# Patient Record
Sex: FEMALE | Race: WHITE | Employment: STUDENT | URBAN - METROPOLITAN AREA
[De-identification: names, ages, dates, MRNs, and addresses within clinical notes are randomized per-mention and may not be internally consistent; named-entity substitution may affect disease eponyms.]

---

## 2017-12-18 ENCOUNTER — HOSPITAL ENCOUNTER (INPATIENT)
Age: 22
LOS: 4 days | Discharge: HOME | DRG: 881 | End: 2017-12-22
Attending: STUDENT IN AN ORGANIZED HEALTH CARE EDUCATION/TRAINING PROGRAM | Admitting: PSYCHIATRY & NEUROLOGY
Payer: COMMERCIAL

## 2017-12-18 DIAGNOSIS — R45.851 SUICIDAL IDEATIONS: ICD-10-CM

## 2017-12-18 DIAGNOSIS — F41.8 ANXIETY ASSOCIATED WITH DEPRESSION: ICD-10-CM

## 2017-12-18 DIAGNOSIS — F32.A DEPRESSION, UNSPECIFIED DEPRESSION TYPE: Primary | ICD-10-CM

## 2017-12-18 LAB
ALBUMIN SERPL-MCNC: 3.9 G/DL (ref 3.5–5)
ALBUMIN/GLOB SERPL: 1 {RATIO} (ref 1.1–2.2)
ALP SERPL-CCNC: 99 U/L (ref 45–117)
ALT SERPL-CCNC: 27 U/L (ref 12–78)
AMPHET UR QL SCN: NEGATIVE
ANION GAP SERPL CALC-SCNC: 5 MMOL/L (ref 5–15)
APAP SERPL-MCNC: <2 UG/ML (ref 10–30)
APPEARANCE UR: ABNORMAL
AST SERPL-CCNC: 16 U/L (ref 15–37)
BACTERIA URNS QL MICRO: NEGATIVE /HPF
BARBITURATES UR QL SCN: NEGATIVE
BASOPHILS # BLD: 0 K/UL (ref 0–0.1)
BASOPHILS NFR BLD: 0 % (ref 0–1)
BENZODIAZ UR QL: NEGATIVE
BILIRUB SERPL-MCNC: 0.3 MG/DL (ref 0.2–1)
BILIRUB UR QL: NEGATIVE
BUN SERPL-MCNC: 8 MG/DL (ref 6–20)
BUN/CREAT SERPL: 12 (ref 12–20)
CALCIUM SERPL-MCNC: 9.4 MG/DL (ref 8.5–10.1)
CANNABINOIDS UR QL SCN: NEGATIVE
CHLORIDE SERPL-SCNC: 103 MMOL/L (ref 97–108)
CO2 SERPL-SCNC: 30 MMOL/L (ref 21–32)
COCAINE UR QL SCN: NEGATIVE
COLOR UR: ABNORMAL
CREAT SERPL-MCNC: 0.67 MG/DL (ref 0.55–1.02)
DRUG SCRN COMMENT,DRGCM: NORMAL
EOSINOPHIL # BLD: 0.1 K/UL (ref 0–0.4)
EOSINOPHIL NFR BLD: 2 % (ref 0–7)
EPITH CASTS URNS QL MICRO: ABNORMAL /LPF
ERYTHROCYTE [DISTWIDTH] IN BLOOD BY AUTOMATED COUNT: 12.6 % (ref 11.5–14.5)
ETHANOL SERPL-MCNC: <10 MG/DL
GLOBULIN SER CALC-MCNC: 3.9 G/DL (ref 2–4)
GLUCOSE SERPL-MCNC: 93 MG/DL (ref 65–100)
GLUCOSE UR STRIP.AUTO-MCNC: NEGATIVE MG/DL
HCG UR QL: NEGATIVE
HCT VFR BLD AUTO: 41.1 % (ref 35–47)
HGB BLD-MCNC: 13.4 G/DL (ref 11.5–16)
HGB UR QL STRIP: NEGATIVE
HYALINE CASTS URNS QL MICRO: ABNORMAL /LPF (ref 0–5)
KETONES UR QL STRIP.AUTO: NEGATIVE MG/DL
LEUKOCYTE ESTERASE UR QL STRIP.AUTO: NEGATIVE
LYMPHOCYTES # BLD: 1.7 K/UL (ref 0.8–3.5)
LYMPHOCYTES NFR BLD: 24 % (ref 12–49)
MCH RBC QN AUTO: 28.2 PG (ref 26–34)
MCHC RBC AUTO-ENTMCNC: 32.6 G/DL (ref 30–36.5)
MCV RBC AUTO: 86.5 FL (ref 80–99)
METHADONE UR QL: NEGATIVE
MONOCYTES # BLD: 0.4 K/UL (ref 0–1)
MONOCYTES NFR BLD: 5 % (ref 5–13)
NEUTS SEG # BLD: 4.9 K/UL (ref 1.8–8)
NEUTS SEG NFR BLD: 69 % (ref 32–75)
NITRITE UR QL STRIP.AUTO: NEGATIVE
OPIATES UR QL: NEGATIVE
PCP UR QL: NEGATIVE
PH UR STRIP: 6 [PH] (ref 5–8)
PLATELET # BLD AUTO: 307 K/UL (ref 150–400)
POTASSIUM SERPL-SCNC: 3.9 MMOL/L (ref 3.5–5.1)
PROT SERPL-MCNC: 7.8 G/DL (ref 6.4–8.2)
PROT UR STRIP-MCNC: NEGATIVE MG/DL
RBC # BLD AUTO: 4.75 M/UL (ref 3.8–5.2)
RBC #/AREA URNS HPF: ABNORMAL /HPF (ref 0–5)
SALICYLATES SERPL-MCNC: <1.7 MG/DL (ref 2.8–20)
SODIUM SERPL-SCNC: 138 MMOL/L (ref 136–145)
SP GR UR REFRACTOMETRY: 1.01 (ref 1–1.03)
TSH SERPL DL<=0.05 MIU/L-ACNC: 1.02 UIU/ML (ref 0.36–3.74)
UA: UC IF INDICATED,UAUC: ABNORMAL
UROBILINOGEN UR QL STRIP.AUTO: 0.2 EU/DL (ref 0.2–1)
WBC # BLD AUTO: 7.1 K/UL (ref 3.6–11)
WBC URNS QL MICRO: ABNORMAL /HPF (ref 0–4)

## 2017-12-18 PROCEDURE — 80307 DRUG TEST PRSMV CHEM ANLYZR: CPT | Performed by: EMERGENCY MEDICINE

## 2017-12-18 PROCEDURE — 65220000003 HC RM SEMIPRIVATE PSYCH

## 2017-12-18 PROCEDURE — 81001 URINALYSIS AUTO W/SCOPE: CPT | Performed by: EMERGENCY MEDICINE

## 2017-12-18 PROCEDURE — 99284 EMERGENCY DEPT VISIT MOD MDM: CPT

## 2017-12-18 PROCEDURE — 36415 COLL VENOUS BLD VENIPUNCTURE: CPT | Performed by: EMERGENCY MEDICINE

## 2017-12-18 PROCEDURE — 85025 COMPLETE CBC W/AUTO DIFF WBC: CPT | Performed by: EMERGENCY MEDICINE

## 2017-12-18 PROCEDURE — 84443 ASSAY THYROID STIM HORMONE: CPT | Performed by: PSYCHIATRY & NEUROLOGY

## 2017-12-18 PROCEDURE — 81025 URINE PREGNANCY TEST: CPT

## 2017-12-18 PROCEDURE — 80053 COMPREHEN METABOLIC PANEL: CPT | Performed by: EMERGENCY MEDICINE

## 2017-12-18 RX ORDER — SERTRALINE HYDROCHLORIDE 50 MG/1
100 TABLET, FILM COATED ORAL DAILY
Status: DISCONTINUED | OUTPATIENT
Start: 2017-12-19 | End: 2017-12-19

## 2017-12-18 RX ORDER — ZOLPIDEM TARTRATE 5 MG/1
5 TABLET ORAL
Status: DISCONTINUED | OUTPATIENT
Start: 2017-12-18 | End: 2017-12-22 | Stop reason: HOSPADM

## 2017-12-18 RX ORDER — SERTRALINE HYDROCHLORIDE 100 MG/1
100 TABLET, FILM COATED ORAL DAILY
Status: ON HOLD | COMMUNITY
End: 2017-12-22

## 2017-12-18 RX ORDER — IBUPROFEN 200 MG
1 TABLET ORAL
Status: DISCONTINUED | OUTPATIENT
Start: 2017-12-18 | End: 2017-12-22 | Stop reason: HOSPADM

## 2017-12-18 RX ORDER — LORAZEPAM 2 MG/ML
2 INJECTION INTRAMUSCULAR
Status: DISCONTINUED | OUTPATIENT
Start: 2017-12-18 | End: 2017-12-22 | Stop reason: HOSPADM

## 2017-12-18 RX ORDER — LORAZEPAM 1 MG/1
1 TABLET ORAL
Status: DISCONTINUED | OUTPATIENT
Start: 2017-12-18 | End: 2017-12-19

## 2017-12-18 RX ORDER — ADHESIVE BANDAGE
30 BANDAGE TOPICAL DAILY PRN
Status: DISCONTINUED | OUTPATIENT
Start: 2017-12-18 | End: 2017-12-22 | Stop reason: HOSPADM

## 2017-12-18 RX ORDER — BENZTROPINE MESYLATE 2 MG/1
2 TABLET ORAL
Status: DISCONTINUED | OUTPATIENT
Start: 2017-12-18 | End: 2017-12-22 | Stop reason: HOSPADM

## 2017-12-18 RX ORDER — ACETAMINOPHEN 325 MG/1
650 TABLET ORAL
Status: DISCONTINUED | OUTPATIENT
Start: 2017-12-18 | End: 2017-12-22 | Stop reason: HOSPADM

## 2017-12-18 RX ORDER — IBUPROFEN 400 MG/1
400 TABLET ORAL
Status: DISCONTINUED | OUTPATIENT
Start: 2017-12-18 | End: 2017-12-22 | Stop reason: HOSPADM

## 2017-12-18 RX ORDER — METHYLPHENIDATE HYDROCHLORIDE 5 MG/1
15 TABLET ORAL 2 TIMES DAILY
COMMUNITY

## 2017-12-18 RX ORDER — BENZTROPINE MESYLATE 1 MG/ML
2 INJECTION INTRAMUSCULAR; INTRAVENOUS
Status: DISCONTINUED | OUTPATIENT
Start: 2017-12-18 | End: 2017-12-22 | Stop reason: HOSPADM

## 2017-12-18 RX ORDER — OLANZAPINE 5 MG/1
5 TABLET ORAL
Status: DISCONTINUED | OUTPATIENT
Start: 2017-12-18 | End: 2017-12-22 | Stop reason: HOSPADM

## 2017-12-18 NOTE — ED PROVIDER NOTES
Patient is a 23 y.o. female presenting with mental health disorder and suicidal ideation. Mental Health Problem    Associated symptoms include self-injury. Suicidal   Pertinent negatives include no shortness of breath, no vomiting and no headaches. Pt states that she suffers from anxiety and depression for years. 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 She has been havign suicidal thoughts and a plan to overdose. She is accompanied by the campus security and a representative from the [place]. Denies fever, cold symptoms, headache, neck pain, visual changes, focal weakness or rash. Denies any difficulty breathing, difficulty swallowing, SOB, chest pain or abdominal pain. Denies any nausea, vomiting or diarrhea. Pt. Reports that she has not had any medications today prior to arrival.        Past Medical History:   Diagnosis Date    ADHD     Asthma     Depression     Psychiatric disorder     depression       History reviewed. No pertinent surgical history. History reviewed. No pertinent family history. Social History     Social History    Marital status: SINGLE     Spouse name: N/A    Number of children: N/A    Years of education: N/A     Occupational History    Not on file. Social History Main Topics    Smoking status: Never Smoker    Smokeless tobacco: Never Used    Alcohol use Yes      Comment: socially    Drug use: No    Sexual activity: Not on file     Other Topics Concern    Not on file     Social History Narrative    No narrative on file         ALLERGIES: Review of patient's allergies indicates no known allergies. Review of Systems   Constitutional: Negative for activity change and appetite change. HENT: Negative for facial swelling, sore throat and trouble swallowing. Eyes: Negative. Respiratory: Negative for shortness of breath. Cardiovascular: Negative.     Gastrointestinal: Negative for abdominal pain, diarrhea and vomiting. Genitourinary: Negative for dysuria. Musculoskeletal: Negative for back pain and neck pain. Skin: Negative for color change. Neurological: Negative for headaches. Psychiatric/Behavioral: Positive for self-injury and suicidal ideas. The patient is nervous/anxious. Vitals:    12/18/17 1628   BP: 123/73   Pulse: 88   Resp: 16   Temp: 98 °F (36.7 °C)   SpO2: 97%   Weight: 87.5 kg (193 lb)   Height: 5' 7\" (1.702 m)            Physical Exam   Constitutional: She is oriented to person, place, and time. She appears well-nourished. White female;non  Smoker; international student/reta at the [employer]:   Head: Normocephalic. Right Ear: External ear normal.   Left Ear: External ear normal.   Mouth/Throat: Oropharynx is clear and moist.   Eyes: Conjunctivae and EOM are normal. Pupils are equal, round, and reactive to light. Wears glasses   Neck: Normal range of motion. Neck supple. Cardiovascular: Normal rate and regular rhythm. Pulmonary/Chest: Effort normal and breath sounds normal.   Abdominal: Soft. Bowel sounds are normal.   Musculoskeletal: Normal range of motion. Lymphadenopathy:     She has no cervical adenopathy. Neurological: She is alert and oriented to person, place, and time. Skin: Skin is warm and dry. No rash noted. Nursing note and vitals reviewed. [facility]  ED Course       Procedures    Bsmart consult and evaluation  Dr. Gustavo Toribio was consulted and will admit. Pt was re-examined; crying and anxious; refused any medications at this time. 6:53 PM  Patient's results and plan of care have been reviewed with her and her school representative. Patient has verbally conveyed her understanding and agreement of her signs, symptoms, diagnosis, treatment and prognosis and additionally agrees to be admitted. Layla Xie NP  Discussed plan of care with Dr. Stanford Denson.  Layla Xie NP

## 2017-12-18 NOTE — IP AVS SNAPSHOT
[address] 
[phone] Patient: Suyapa Domingo MRN: [id number] :1995 My Medications STOP taking these medications   
 diazePAM 5 mg tablet Commonly known as:  VALIUM  
   
  
  
TAKE these medications as instructed Instructions Each Dose to Equal  
 Morning Noon Evening Bedtime RITALIN 5 mg tablet Generic drug:  methylphenidate HCl Your last dose was: Your next dose is: Take 15 mg by mouth two (2) times a day. 15 mg  
    
   
   
   
  
 sertraline 100 mg tablet Commonly known as:  ZOLOFT Your last dose was: Your next dose is: Take 1.5 Tabs by mouth daily. Indications: major depressive disorder 150 mg Where to Get Your Medications These medications were sent to [facility]/pharmacy #[id number]- [place], [address] [facility] [address]  [address] [place] [address] Phone:  [phone]  
  sertraline 100 mg tablet

## 2017-12-18 NOTE — IP AVS SNAPSHOT
Summary of Care Report The Summary of Care report has been created to help improve care coordination. Users with access to [employer] or [address] (Web-based application) may access additional patient information including the Discharge Summary. If you are not currently a [address] user and need more information, please call the number listed below in the [place] [address] section and ask to be connected with Medical Records. Facility Information Name Address Phone [phone] [facility] [phone] Patient Information Patient Name Sex MARY JANE Vasquez ([id number]) Female 1995 Discharge Information Admitting Provider Service Area Unit Sofia Christie MD / [id number] / [phone] Discharge Provider Discharge Date/Time Discharge Disposition Destination (none) 2017 Afternoon (Pending) AHR (none) Patient Language Language ENGLISH [13] Hospital Problems as of 2017  Never Reviewed Class Noted - Resolved Last Modified POA Active Problems * (Principal)Depression  2017 - Present 2017 by Daryl Cee MD Unknown Entered by Sofia Christie MD  
  
You are allergic to the following No active allergies Current Discharge Medication List  
  
CONTINUE these medications which have CHANGED Dose & Instructions Dispensing Information Comments  
 sertraline 100 mg tablet Commonly known as:  ZOLOFT What changed:  how much to take Dose:  150 mg Take 1.5 Tabs by mouth daily. Indications: major depressive disorder Quantity:  45 Tab Refills:  0 CONTINUE these medications which have NOT CHANGED Dose & Instructions Dispensing Information Comments RITALIN 5 mg tablet Generic drug:  methylphenidate HCl Dose:  15 mg Take 15 mg by mouth two (2) times a day. Refills:  0 STOP taking these medications Comments  
 diazePAM 5 mg tablet Commonly known as:  VALIUM Follow-up Information Follow up With Details Comments Contact Info [facility]OTT POLANCO [facility] Partial Hospitalization Program On 2017 Time of Your Appt: 11:00 am [place] [facility] 
[id number] [facility] [address] (To the left of the Emergency Department) [facility] 
[phone] Fax to [facility] @ [phone] [address] Sail[address] Rogersintia On 2018 [place] of Your Appt: 3:00pm [facility] Wanda Gordon [address] [facility], [address] [place] [address] 
[phone] Dr Tank Boo  On 2018 Time of Your Appt: 12 Noon [employer] CAPS None   None (395) Patient stated that they have no PCP Discharge Instructions DISCHARGE SUMMARY 
 
Latonya Matthews : 1995 MRN: [id number] The patient Alonzo Lakhani exhibits the ability to control behavior in a less restrictive environment. Patient's level of functioning is improving. No assaultive/destructive behavior has been observed for the past 24 hours. No suicidal/homicidal threat or behavior has been observed for the past 24 hours. There is no evidence of serious medication side effects. Patient has not been in physical or protective restraints for at least the past 24 hours. If weapons involved, how are they secured? No weapons involved. Is patient aware of and in agreement with discharge plan? Yes Arrangements for medication:  Prescriptions given to patient. Referral for substance abuse treatment? Not applicable. Referral for smoking cessation needed? Not applicable. Copy of discharge instructions to provider?:  Mike Reynolds @ [phone] Arrangements for transportation home:  Linda Puga 326 Keep all follow up appointments as scheduled, continue to take prescribed medications per physician instructions. Mental health crisis number:  249 or your local mental health crisis line number at [phone]. Chart Review Routing History No Routing History on File

## 2017-12-18 NOTE — IP AVS SNAPSHOT
[address] 
[phone] Patient: Ashish Cesar MRN: [id number] :1995 About your hospitalization You were admitted on:  2017 You last received care in the:  [address] You were discharged on:  2017 Why you were hospitalized Your primary diagnosis was:  Depression Things You Need To Do (next 8 weeks) Follow up with None Where:  None (395) Patient stated that they have no PCP Tuesday Dec 26, 2017 Follow up with SCOTT POLANCO [facility] Hospitalization [facility]  
Time of Your Appt: 11:00 am  
  
Where:  [place] [facility] 
[id number] [facility] [address] (To the left of the Emergency Department) [facility] 
[phone] Fax to Rupert Garcia @ [phone]  Follow up with Lashon Medina of Your Appt: 3:00pm  
  
Where:  [facility] [employer] [facility], [address] [place] [address] 
[phone]  Follow up with Dr Roxana Callahan Time of Your Appt: 12 Noon Where:  [place] CAPS Discharge Orders None A check rick indicates which time of day the medication should be taken. My Medications STOP taking these medications   
 diazePAM 5 mg tablet Commonly known as:  VALIUM  
   
  
  
TAKE these medications as instructed Instructions Each Dose to Equal  
 Morning Noon Evening Bedtime RITALIN 5 mg tablet Generic drug:  methylphenidate HCl Your last dose was: Your next dose is: Take 15 mg by mouth two (2) times a day. 15 mg  
    
   
   
   
  
 sertraline 100 mg tablet Commonly known as:  ZOLOFT Your last dose was: Your next dose is: Take 1.5 Tabs by mouth daily. Indications: major depressive disorder 150 mg Where to Get Your Medications These medications were sent to [facility]/pharmacy #[id number]- [place], [address]  [address] [facility], [address] [place] [address] Phone:  [phone]  
  sertraline 100 mg tablet Discharge Instructions DISCHARGE SUMMARY 
 
Celestine Penn : 1995 MRN: [id number] The patient Ananya Gonsalez exhibits the ability to control behavior in a less restrictive environment. Patient's level of functioning is improving. No assaultive/destructive behavior has been observed for the past 24 hours. No suicidal/homicidal threat or behavior has been observed for the past 24 hours. There is no evidence of serious medication side effects. Patient has not been in physical or protective restraints for at least the past 24 hours. If weapons involved, how are they secured? No weapons involved. Is patient aware of and in agreement with discharge plan? Yes Arrangements for medication:  Prescriptions given to patient. Referral for substance abuse treatment? Not applicable. Referral for smoking cessation needed? Not applicable. Copy of discharge instructions to provider?:  Amanda Reynolds @ [phone] Arrangements for transportation home:  Linda Puga [facility] Keep all follow up appointments as scheduled, continue to take prescribed medications per physician instructions. Mental health crisis number:  900 or your local mental health crisis line number at [phone]. Introducing [facility]! [facility] introduces [employer] patient portal. Now you can access parts of your medical record, email your doctor's office, and request medication refills online. 1. In your internet browser, go to [email or web address] 2. Click on the First Time User? Click Here link in the Sign In box. You will see the New Member Sign Up page. 3. Enter your Sitem[employer]her Access Code exactly as it appears below. You will not need to use this code after youve completed the sign-up process. If you do not sign up before the expiration date, you must request a new code. · S[employer]her Access Code: [email or web address] Expires: 3/22/2018  3:56 PM 
 
4. Enter the last four digits of your Social Security Number (xxxx) and Date of Birth (mm/dd/yyyy) as indicated and click Submit. You will be taken to the next sign-up page. 5. Create a Sitemasher ID. This will be your Sitemasher login ID and cannot be changed, so think of one that is secure and easy to remember. 6. Create a S[employer]her password. You can change your password at any time. 7. Enter your Password Reset Question and Answer. This can be used at a later time if you forget your password. 8. Enter your e-mail address. You will receive e-mail notification when new information is available in [address] 9. Click Sign Up. You can now view and download portions of your medical record. 10. Click the Download Summary menu link to download a portable copy of your medical information. If you have questions, please visit the Frequently Asked Questions section of the S[employer]her website. Remember, Sitemasher is NOT to be used for urgent needs. For medical emergencies, dial 911. Now available from your iPhone and Android! Providers Seen During Your Hospitalization Provider Specialty Primary office phone India Cisneros MD Emergency Medicine [phone] Екатерина Rodriguez MD Psychiatry [phone] Your Primary Care Physician (PCP) Primary Care Physician Office Phone Office Fax NONE ** None ** ** None ** You are allergic to the following No active allergies Recent Documentation Height Weight Breastfeeding? BMI Smoking Status 1.702 m 87.5 kg No 30.23 kg/m2 Never Smoker Emergency Contacts Name Discharge Info Relation Home Work Mobile None,None NO [2] Other Relative [6] [phone] Patient Belongings The following personal items are in your possession at time of discharge: 
  Dental Appliances: None  Visual Aid: Glasses, With patient      Home Medications: None   Jewelry: None  Clothing: Shirt (3 shirtys)    Other Valuables: None  Personal Items Sent to Safe: none Please provide this summary of care documentation to your next provider. Signatures-by signing, you are acknowledging that this After Visit Summary has been reviewed with you and you have received a copy. Patient Signature:  ____________________________________________________________ Date:  ____________________________________________________________  
  
[facility] Gene Provider Signature:  ____________________________________________________________ Date:  ____________________________________________________________

## 2017-12-18 NOTE — ED TRIAGE NOTES
Pt brought in by [employer] for Suicidal Ideation for a couple of days, police called by counselor. Reports increased stress. Plan to overdose on Tylenol. Hx of previous attempts with psychiatric admission. Denies HI. Pt contracts to safety while in ED, [employer] accompanying patient.

## 2017-12-19 PROCEDURE — 74011250637 HC RX REV CODE- 250/637: Performed by: PSYCHIATRY & NEUROLOGY

## 2017-12-19 PROCEDURE — 65220000003 HC RM SEMIPRIVATE PSYCH

## 2017-12-19 RX ORDER — SERTRALINE HYDROCHLORIDE 50 MG/1
150 TABLET, FILM COATED ORAL DAILY
Status: DISCONTINUED | OUTPATIENT
Start: 2017-12-20 | End: 2017-12-22 | Stop reason: HOSPADM

## 2017-12-19 RX ORDER — LORAZEPAM 0.5 MG/1
0.5 TABLET ORAL
Status: DISCONTINUED | OUTPATIENT
Start: 2017-12-19 | End: 2017-12-22 | Stop reason: HOSPADM

## 2017-12-19 RX ORDER — METHYLPHENIDATE HYDROCHLORIDE 5 MG/1
15 TABLET ORAL 2 TIMES DAILY
Status: DISCONTINUED | OUTPATIENT
Start: 2017-12-19 | End: 2017-12-22 | Stop reason: HOSPADM

## 2017-12-19 RX ORDER — HYDROXYZINE 50 MG/1
50 TABLET, FILM COATED ORAL
Status: DISCONTINUED | OUTPATIENT
Start: 2017-12-19 | End: 2017-12-22 | Stop reason: HOSPADM

## 2017-12-19 RX ORDER — DIAZEPAM 5 MG/1
5 TABLET ORAL
COMMUNITY
End: 2017-12-22

## 2017-12-19 RX ADMIN — METHYLPHENIDATE HYDROCHLORIDE 15 MG: 5 TABLET ORAL at 11:34

## 2017-12-19 RX ADMIN — SERTRALINE HYDROCHLORIDE 100 MG: 50 TABLET ORAL at 08:04

## 2017-12-19 RX ADMIN — ZOLPIDEM TARTRATE 5 MG: 5 TABLET ORAL at 21:29

## 2017-12-19 RX ADMIN — LORAZEPAM 0.5 MG: 0.5 TABLET ORAL at 23:09

## 2017-12-19 NOTE — BH NOTES
GROUP THERAPY PROGRESS NOTE    Tyler Nobles is participating in reflection group.      Group time: 15 minutes    Personal goal for participation: Daily progress     Goal orientation: personal    Group therapy participation: active    Therapeutic interventions reviewed and discussed:  Unit rules and regulations, coloring festive pictures while talking about PT daily goal    Impression of participation: active

## 2017-12-19 NOTE — PROGRESS NOTES
Luisa Presley actively participated in 9 [facility] about [address] [facility] on [address] [facility].      6377 Christina Jones M.Div, M.S, Grace 602 available at [facility](7038)

## 2017-12-19 NOTE — INTERDISCIPLINARY ROUNDS
Behavioral Health Interdisciplinary Rounds     Patient Name: Bernadette Vera  Age: 23 y.o.   Room/Bed:  727/  Primary Diagnosis: <principal problem not specified>   Admission Status: Voluntary     Readmission within 30 days: no  Power of  in place: no  Patient requires a blocked bed: no          Reason for blocked bed: n/a    VTE Prophylaxis: No  Flu vaccine given : no - refused   Mobility needs/Fall risk: no    Nutritional Plan: no  Consults:          Labs/Testing due today?: no    Sleep hours:   4 1/4hrs      Participation in Care/Groups:  yes  Medication Compliant?: New Admit  PRNS (last 24 hours): None    Restraints (last 24 hours):  no  Substance Abuse:  no  CIWA (range last 24 hours):  COWS (range last 24 hours):   Alcohol screening (AUDIT) completed -  AUDIT Score: 2  If applicable, date SBIRT discussed in treatment team AND documented: N/A  Tobacco - patient is a smoker: no   Date tobacco education completed by RN: n/a  24 hour chart check complete: yes     Patient goal(s) for today: attend all groups  Treatment team focus/goals: psychosocial; start medications  Progress note: Patient reports that she has been feeling suicidal.  Cannot return home to [place] for the holidays due to finances     LOS:  1  Expected LOS: TBD    Financial concerns/prescription coverage: Uninsured  Date of last family contact: None      Family requesting physician contact today: No  Discharge plan: Return to campus when stable for discharge  Guns in the home: No       Outpatient provider(s): [facility]    Participating treatment team members: Bernadette Vera, RHIANNON Buck; Dr. Elder Shabazz MD; Mark Villafana, BILLIE; Lina Lange, GisellaD

## 2017-12-19 NOTE — BH NOTES
PSYCHIATRIC PROGRESS NOTE         Patient Name  Mattie Boyce   Date of Birth 1995   [facility] [id number]   Medical Record Number  [id number]      Age  23 y.o. PCP None   Admit date:  12/18/2017    Room Number  727/01  @ [facility]   Date of Service  12/19/2017          PSYCHOTHERAPY SESSION NOTE:  Length of psychotherapy session: 45 minutes    Main condition/diagnosis/issues treated during session today, 12/19/2017 : anxiety management, coping skills, medication management    I employed Cognitive Behavioral therapy techniques, Reality-Oriented psychotherapy, as well as supportive psychotherapy in regards to various ongoing psychosocial stressors, including the following: pre-admission and current problems; housing issues; occupational issues; academic issues; medical issues; and stress of hospitalization. Interpersonal relationship issues and psychodynamic conflicts explored. Attempts made to alleviate maladaptive patterns. We, also, worked on issues of denial & effects of substance dependency/use     Overall, patient is not progressing    Treatment Plan Update (reviewed an updated 12/19/2017) : I will modify psychotherapy tx plan by implementing more stress management strategies, building upon cognitive behavioral techniques, increasing coping skills, as well as shoring up psychological defenses). n extended energy and skill set was needed to engage pt in psychotherapy due to some of the following: resistiveness, complexity, negativity, confrontational nature, hostile behaviors, and/or severe abnormalities in thought processes/psychosis resulting in the loss of expressive/receptive language communication skills. E & M PROGRESS NOTE:         HISTORY       CC:  \"SI depression \"  HISTORY OF PRESENT ILLNESS/INTERVAL HISTORY:  (reviewed/updated 12/19/2017).   per initial evaluation:     Mattie Boyce presents/reports/evidences the following emotional symptoms today, 12/19/2017:depression and suicidal thoughts/threats. The above symptoms have been present for 8 months . These symptoms are of severe severity. The symptoms are constant  in nature. Additional symptomatology and features include anxiety. SIDE EFFECTS: (reviewed/updated 12/19/2017)  None reported or admitted to. No noted toxicity with use of Depakote/Tegretol/lithium/Clozaril/TCAs   ALLERGIES:(reviewed/updated 12/19/2017)  No Known Allergies   MEDICATIONS PRIOR TO ADMISSION:(reviewed/updated 12/19/2017)  Prescriptions Prior to Admission   Medication Sig    diazePAM (VALIUM) 5 mg tablet Take 5 mg by mouth daily as needed for Anxiety. Indications: anxiety    sertraline (ZOLOFT) 100 mg tablet Take 100 mg by mouth daily.  methylphenidate HCl (RITALIN) 5 mg tablet Take 15 mg by mouth two (2) times a day. PAST MEDICAL HISTORY: Past medical history from the initial psychiatric evaluation has been reviewed (reviewed/updated 12/19/2017) with no additional updates (I asked patient and no additional past medical history provided). Past Medical History:   Diagnosis Date    ADHD     Asthma     Depression     Psychiatric disorder     depression    Sleep disorder     Suicidal thoughts    History reviewed. No pertinent surgical history. SOCIAL HISTORY: Social history from the initial psychiatric evaluation has been reviewed (reviewed/updated 12/19/2017) with no additional updates (I asked patient and no additional social history provided). Social History     Social History    Marital status: SINGLE     Spouse name: N/A    Number of children: N/A    Years of education: N/A     Occupational History    Not on file.      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 Pt is depressed and has SI with no plan. She is from [facility] ,and is homesick. She lives alone. FAMILY HISTORY: Family history from the initial psychiatric evaluation has been reviewed (reviewed/updated 12/19/2017) with no additional updates (I asked patient and no additional family history provided). History reviewed. No pertinent family history. REVIEW OF SYSTEMS: (reviewed/updated 12/19/2017)  Appetite:no change from normal   Sleep: no change   All other Review of Systems: Psychological ROS: positive for - behavioral disorder  Respiratory ROS: no cough, shortness of breath, or wheezing  Cardiovascular ROS: no chest pain or dyspnea on exertion         [facility] (MSE):    MSE FINDINGS ARE WITHIN NORMAL LIMITS (WNL) UNLESS OTHERWISE STATED BELOW. ( ALL OF THE BELOW CATEGORIES OF THE MSE HAVE BEEN REVIEWED (reviewed 12/19/2017) AND UPDATED AS DEEMED APPROPRIATE )  General Presentation age appropriate, cooperative   Orientation oriented to time, place and person   Vital Signs  See below (reviewed 12/19/2017); Vital Signs (BP, Pulse, & Temp) are within normal limits if not listed below.    Gait and Station Stable/steady, no ataxia   Musculoskeletal System No extrapyramidal symptoms (EPS); no abnormal muscular movements or Tardive Dyskinesia (TD); muscle strength and tone are within normal limits   Language No aphasia or dysarthria   Speech:  monotone   Thought Processes logical; normal rate of thoughts; poor abstract reasoning/computation   Thought Associations circumstantial   Thought Content free of delusions   Suicidal Ideations contracts for safety   Homicidal Ideations none   Mood:  sad   Affect:  mood-congruent   Memory recent  fair   Memory remote:  fair   Concentration/Attention:  distractable   Fund of Knowledge average   Insight:  limited   Reliability fair   Judgment:  limited VITALS:     Patient Vitals for the past 24 hrs:   Temp Pulse Resp BP SpO2   12/19/17 1131 97.7 °F (36.5 °C) 68 16 116/63 98 %   12/19/17 0715 98.3 °F (36.8 °C) 75 16 122/82 97 %   12/18/17 1935 98.4 °F (36.9 °C) 73 18 125/76 99 %   12/18/17 1854 98.2 °F (36.8 °C) 74 16 131/76 100 %   12/18/17 1628 98 °F (36.7 °C) 88 16 123/73 97 %     Wt Readings from Last 3 Encounters:   12/18/17 87.5 kg (193 lb)     Temp Readings from Last 3 Encounters:   12/19/17 97.7 °F (36.5 °C)     BP Readings from Last 3 Encounters:   12/19/17 116/63     Pulse Readings from Last 3 Encounters:   12/19/17 68            DATA     LABORATORY DATA:(reviewed/updated 12/19/2017)  Recent Results (from the past 24 hour(s))   CBC WITH AUTOMATED DIFF    Collection Time: 12/18/17  4:54 PM   Result Value Ref Range    WBC 7.1 3.6 - 11.0 K/uL    RBC 4.75 3.80 - 5.20 M/uL    HGB 13.4 11.5 - 16.0 g/dL    HCT 41.1 35.0 - 47.0 %    MCV 86.5 80.0 - 99.0 FL    MCH 28.2 26.0 - 34.0 PG    MCHC 32.6 30.0 - 36.5 g/dL    RDW 12.6 11.5 - 14.5 %    PLATELET 300 767 - 160 K/uL    NEUTROPHILS 69 32 - 75 %    LYMPHOCYTES 24 12 - 49 %    MONOCYTES 5 5 - 13 %    EOSINOPHILS 2 0 - 7 %    BASOPHILS 0 0 - 1 %    ABS. NEUTROPHILS 4.9 1.8 - 8.0 K/UL    ABS. LYMPHOCYTES 1.7 0.8 - 3.5 K/UL    ABS. MONOCYTES 0.4 0.0 - 1.0 K/UL    ABS. EOSINOPHILS 0.1 0.0 - 0.4 K/UL    ABS. BASOPHILS 0.0 0.0 - 0.1 K/UL   METABOLIC PANEL, COMPREHENSIVE    Collection Time: 12/18/17  4:54 PM   Result Value Ref Range    Sodium 138 136 - 145 mmol/L    Potassium 3.9 3.5 - 5.1 mmol/L    Chloride 103 97 - 108 mmol/L    CO2 30 21 - 32 mmol/L    Anion gap 5 5 - 15 mmol/L    Glucose 93 65 - 100 mg/dL    BUN 8 6 - 20 MG/DL    Creatinine 0.67 0.55 - 1.02 MG/DL    BUN/Creatinine ratio 12 12 - 20      GFR est AA >60 >60 ml/min/1.73m2    GFR est non-AA >60 >60 ml/min/1.73m2    Calcium 9.4 8.5 - 10.1 MG/DL    Bilirubin, total 0.3 0.2 - 1.0 MG/DL    ALT (SGPT) 27 12 - 78 U/L    AST (SGOT) 16 15 - 37 U/L    Alk. phosphatase 99 45 - 117 U/L    Protein, total 7.8 6.4 - 8.2 g/dL    Albumin 3.9 3.5 - 5.0 g/dL    Globulin 3.9 2.0 - 4.0 g/dL    A-G Ratio 1.0 (L) 1.1 - 2.2     ACETAMINOPHEN    Collection Time: 12/18/17  4:54 PM   Result Value Ref Range    Acetaminophen level <2 (L) 10 - 30 ug/mL   SALICYLATE    Collection Time: 12/18/17  4:54 PM   Result Value Ref Range    Salicylate level <0.9 (L) 2.8 - 20.0 MG/DL   ETHYL ALCOHOL    Collection Time: 12/18/17  4:54 PM   Result Value Ref Range    ALCOHOL(ETHYL),SERUM <10 <10 MG/DL   TSH 3RD GENERATION    Collection Time: 12/18/17  4:54 PM   Result Value Ref Range    TSH 1.02 0.36 - 3.74 uIU/mL   HCG URINE, QL. - POC    Collection Time: 12/18/17  5:03 PM   Result Value Ref Range    Pregnancy test,urine (POC) NEGATIVE  NEG     URINALYSIS W/ REFLEX CULTURE    Collection Time: 12/18/17  5:05 PM   Result Value Ref Range    Color YELLOW/STRAW      Appearance CLOUDY (A) CLEAR      Specific gravity 1.015 1.003 - 1.030      pH (UA) 6.0 5.0 - 8.0      Protein NEGATIVE  NEG mg/dL    Glucose NEGATIVE  NEG mg/dL    Ketone NEGATIVE  NEG mg/dL    Bilirubin NEGATIVE  NEG      Blood NEGATIVE  NEG      Urobilinogen 0.2 0.2 - 1.0 EU/dL    Nitrites NEGATIVE  NEG      Leukocyte Esterase NEGATIVE  NEG      WBC 0-4 0 - 4 /hpf    RBC 5-10 0 - 5 /hpf    Epithelial cells FEW FEW /lpf    Bacteria NEGATIVE  NEG /hpf    UA:UC IF INDICATED CULTURE NOT INDICATED BY UA RESULT CNI      Hyaline cast 2-5 0 - 5 /lpf   DRUG SCREEN, URINE    Collection Time: 12/18/17  5:05 PM   Result Value Ref Range    AMPHETAMINES NEGATIVE  NEG      BARBITURATES NEGATIVE  NEG      BENZODIAZEPINES NEGATIVE  NEG      COCAINE NEGATIVE  NEG      METHADONE NEGATIVE  NEG      OPIATES NEGATIVE  NEG      PCP(PHENCYCLIDINE) NEGATIVE  NEG      THC (TH-CANNABINOL) NEGATIVE  NEG      Drug screen comment (NOTE)      No results found for: VALF2, VALAC, VALP, VALPR, DS6, CRBAM, CRBAMP, CARB2, XCRBAM  No results found for: LITHM   RADIOLOGY REPORTS:(reviewed/updated 12/19/2017)  No results found.        MEDICATIONS     ALL MEDICATIONS:   Current Facility-Administered Medications   Medication Dose Route Frequency    methylphenidate HCl (RITALIN) tablet 15 mg  15 mg Oral BID    [START ON 12/20/2017] sertraline (ZOLOFT) tablet 150 mg  150 mg Oral DAILY    LORazepam (ATIVAN) tablet 0.5 mg  0.5 mg Oral BID PRN    hydrOXYzine HCl (ATARAX) tablet 50 mg  50 mg Oral QID PRN    ziprasidone (GEODON) 20 mg in sterile water (preservative free) 1 mL injection  20 mg IntraMUSCular BID PRN    OLANZapine (ZyPREXA) tablet 5 mg  5 mg Oral Q6H PRN    benztropine (COGENTIN) tablet 2 mg  2 mg Oral BID PRN    benztropine (COGENTIN) injection 2 mg  2 mg IntraMUSCular BID PRN    LORazepam (ATIVAN) injection 2 mg  2 mg IntraMUSCular Q4H PRN    zolpidem (AMBIEN) tablet 5 mg  5 mg Oral QHS PRN    acetaminophen (TYLENOL) tablet 650 mg  650 mg Oral Q4H PRN    ibuprofen (MOTRIN) tablet 400 mg  400 mg Oral Q8H PRN    magnesium hydroxide (MILK OF MAGNESIA) 400 mg/5 mL oral suspension 30 mL  30 mL Oral DAILY PRN    nicotine (NICODERM CQ) 21 mg/24 hr patch 1 Patch  1 Patch TransDERmal DAILY PRN      SCHEDULED MEDICATIONS:   Current Facility-Administered Medications   Medication Dose Route Frequency    methylphenidate HCl (RITALIN) tablet 15 mg  15 mg Oral BID    [START ON 12/20/2017] sertraline (ZOLOFT) tablet 150 mg  150 mg Oral DAILY          ASSESSMENT & PLAN     DIAGNOSES REQUIRING ACTIVE TREATMENT AND MONITORING: (reviewed/updated 12/19/2017)  Patient Active Hospital Problem List:   Depression (12/18/2017)    Assessment: sadness, hopelessness, helplessness, poor energy and insomnia     Plan: Increase antidepressant and consider augmentation             I will continue to monitor blood levels (Depakote, Tegretol, lithium, clozapine---a drug with a narrow therapeutic index= NTI) and associated labs for drug therapy implemented that require intense monitoring for toxicity as deemed appropriate based on current medication side effects and pharmacodynamically determined drug 1/2 lives. In summary, Davis Cortes, is a 23 y.o.  female who presents with a severe exacerbation of the principal diagnosis of Depression  Patient's condition is worsening/not improving/not stable   Patient requires continued inpatient hospitalization for further stabilization, safety monitoring and medication management. I will continue to coordinate the provision of individual, milieu, occupational, group, and substance abuse therapies to address target symptoms/diagnoses as deemed appropriate for the individual patient. A coordinated, multidisplinary treatment team round was conducted with the patient (this team consists of the nurse, psychiatric unit pharmcist,  and writer). Complete current electronic health record for patient has been reviewed today including consultant notes, ancillary staff notes, nurses and psychiatric tech notes. Suicide risk assessment completed and patient deemed to be of low risk for suicide at this time. The following regarding medications was addressed during rounds with patient:   the risks and benefits of the proposed medication. The patient was given the opportunity to ask questions. Informed consent given to the use of the above medications. Will continue to adjust psychiatric and non-psychiatric medications (see above \"medication\" section and orders section for details) as deemed appropriate & based upon diagnoses and response to treatment. I will continue to order blood tests/labs and diagnostic tests as deemed appropriate and review results as they become available (see orders for details and above listed lab/test results). I will order psychiatric records from previous [facility] hospitals to further elucidate the nature of patient's psychopathology and review once available.     I will gather additional collateral information from friends, family and o/p treatment team to further elucidate the nature of patient's psychopathology and baselline level of psychiatric functioning. I certify that this patient's inpatient psychiatric hospital services furnished since the previous certification were, and continue to be, required for treatment that could reasonably be expected to improve the patient's condition, or for diagnostic study, and that the patient continues to need, on a daily basis, active treatment furnished directly by or requiring the supervision of inpatient psychiatric facility personnel. In addition, the hospital records show that services furnished were intensive treatment services, admission or related services, or equivalent services.     EXPECTED DISCHARGE DATE/DAY: TBD     DISPOSITION: Home       Signed By:   Guru Santos MD  12/19/2017

## 2017-12-19 NOTE — PROGRESS NOTES
Skin Assessment performed by: SANTIAGO, RN and NURIA RN    Skin is intact. ressure Ulcer Documentation  (COMPLETE ONE LABEL PER PRESSURE ULCER)  For further information, please review corresponding Wound Care flowsheet. Lucillebetina Zachary has:    No pressure injury noted and pressure ulcer prevention initiated.

## 2017-12-19 NOTE — ROUTINE PROCESS
TRANSFER - OUT REPORT:    Verbal report given to Cony Matt RN (name) on Ulisses Reeves  being transferred to Psych (unit) for routine progression of care       Report consisted of patients Situation, Background, Assessment and   Recommendations(SBAR). Information from the following report(s) SBAR, ED Summary, [facility] and Recent Results was reviewed with the receiving nurse. Lines:       Opportunity for questions and clarification was provided.       Patient transported with:   Registered Nurse & HPD

## 2017-12-19 NOTE — PROGRESS NOTES
Problem: Depressed Mood (Adult/Pediatric)  Goal: *STG: Remains safe in hospital  Outcome: Progressing Towards Goal  Pt asleep in bed. Continues on q15 min checks and [place] Precautions for safety. Will continue to monitor and assess pt.

## 2017-12-19 NOTE — BH NOTES
GROUP THERAPY PROGRESS NOTE    Aaron Olivo [Héctor] participated in a Morning Process Group on the General Unit with a focus on identifying feelings, planning for the day, and learning more about DBT concepts of \"Mindfulness. \"     Group time: 65 minutes. Personal goal for participation: To identify feelings and increase the capacity to manage ones ability to cope. Goal orientation: The patient will be able to introduce themselves to their peers, identify their feelings, and consider the importance of coping skill development. The group session included a didactic section and the opportunity for patients to respond. Group therapy participation: This patient participated in the therapy session. Therapeutic interventions reviewed and discussed: The patients were encouraged to identify themselves by their first names, acknowledge their feelings, and define a goal for their day. This was followed by a didactic session on DBT mindsets. These concepts included:   1) One-Mindfully: In the moment on the front and the following suggestions on the back of the card: a) Just do one thing at a time; b) Let go of distractions; c) Come back to the moment and what you are doing; and d) Do each thing with all your attention. 2) Effectiveness: Focus on what works: a) Do what needs to be done; b) Play by the rules (I am not an exception) and consider the context; c) Act skillfully within the given situation; d) Keep an eye on your objectives (and do what is necessary); e) Let go of vengeance, useless anger, and the need to be righteous. 3) Observe: Just notice: a) Have a Phil mind; b) Control your attention, cling to nothing; c) Be alert; d) Step inside and observe; e) Watch thoughts come and go; f) Notice what flows through your senses.    4) Nonjudgmental Stance: a) Be aware but dont evaluate; b) Sort opinions from facts; c) Accept each moment; d) Acknowledge the helpful and the harmful, but dont  it; e) Dont  your judging. 5) Wise Mind: a) Integration of emotion mind and reasonable mind; b) Allows intuition; c) Find it in the belly, the center of your head, or by following your breath. At the end of the session all the group members were provided a summary of the topics discussed and a worksheet to review on their own time. Impression of participation: The patient said she was feeling \"tired. ..and wanted to die, before coming to the hospital.\" She denied any current SI and any HI. The patient displayed no overt psychotic symptoms in group. She added that, \"I have no where to go for [place]. 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 \" She went on to say she was studying American literature and that she was a student at the [employer]. She participated the didactic portion of the session and displayed no overt psychotic symptoms in group. Her affect was depressed. Her mood was sad and homesick. This was the patient's first process group with the undersigned.

## 2017-12-19 NOTE — BSMART NOTE
Comprehensive Assessment Form Part 1    Section I - Disposition      The Medical Doctor to Psychiatrist conference was completed. The Medical Doctor is in agreement with Psychiatrist disposition because of (reason) suicidal ideation with plan to overdose. The plan is to admit the patient to the general unit in the [facility] at [facility].  The on-call Psychiatrist consulted was Dr. Ren Paulino. The admitting Psychiatrist will be Dr. Ren Paulino. The admitting Diagnosis is Depression and Anxiety Disorder . Section II - Integrated Summary  Summary:  The Patient's therapist, Rocío Reynolds, called this writer to inform that the patient would be arriving in the ED with [facility] staff and police with suicidal ideation. Rocío Reynolds explained that the patient considers himself transgender and if planning to transition to a male and be called Yris Cabrales. The patient reportedly did not arrive for the weekly Friday session and there were concerns that she was decompensating. She was able to contract for safety through the weekend but on Monday during a scheduled session she shared that she had suicidal ideation including a plan to overdose. She also disclosed to her therapist that she had taken 3 tylenol on Sunday but then stopped herself. She is seeing her campus therapist for trauma and anxiety. 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 This writer met with the patient individually; Gerald Steen presented with flat affect, hugging her stuffed animal and with depressed mood. She reports that she wants \"to die\" and states the reason being \"school, friends, just life it doesn't feel worth it. \" Patient reports a lack of appetite but when asked about sleep she stated her sleep is \"nice\" and about 5 hours last night.  In the middle of the interview she looked up and stated \"your clock isn't working\" and had to be redirected to the interview. She reports a diagnosis of Depression/ADHD and \"I think at one point there was talk of some OCD. Pt is voluntary and agreeable to in-patient behavior health hospitalization. The patient is deemed competent to provide informed consent. The information is given by the patient and campus therapist, Bimal Reynolds. The Chief Complaint is suicidal ideation and depression. The Precipitant Factors are friends leaving for the holiday and her feeling alone. Previous Hospitalizations: April 2017 for an attempted OD  The patient has not previously been in restraints. Current Psychiatrist and/or  is [facility] of [employer]. Lethality Assessment:    The potential for suicide is noted by the following: noted by the following;  intent, previous history of attempts which occured on (date)April 2017 in the form(s) of Overdose of Tylenol, defined plan, current attempt, ideation and means. The potential for homicide is not noted. The patient has not been a perpetrator of sexual or physical abuse. There are not pending charges. The patient is felt to be at risk for self harm or harm to others. The attending nurse was advised to remove potentially harmful or dangerous items from the patient's room . Section III - Psychosocial  The patient's overall mood and attitude is Depressed and sad. Feelings of helplessness and hopelessness are observed by Patient report. Generalized anxiety is not observed. Panic is not observed. Phobias are not observed. Obsessive compulsive tendencies are not observed. Section IV - Mental Status Exam  The patient's appearance is unkempt and is tense. The patient's behavior is guarded. The patient is oriented to time, place, person and situation. The patient's speech is soft. The patient's mood  is depressed and is withdrawn. The range of affect is labile.   The patient's thought content  demonstrates no evidence of impairment. The thought process perseveration. The patient's perception shows no evidence of impairment. The patient's memory shows no evidence of impairment. The patient's appetite shows no evidence of impairment. The patient's sleep has no evidence of impairement. The patient shows little insight. The patient's judgement is psychologically impaired. Section V - Substance Abuse  The patient is not using substances. Section VI - Living Arrangements  The patient is single. The patient lives alone. The patient has no children. The patient does plan to return home upon discharge. The patient does not have legal issues pending. The patient's source of income comes from family. Roman Catholic and cultural practices have not been voiced at this time. The patient's greatest support comes from friends and school staff and this person will not be involved with the treatment. The patient has not been in an event described as horrible or outside the realm of ordinary life experience either currently or in the past.  The patient has not been a victim of sexual/physical abuse though her therapist reported that she did. Section VII - Other Areas of Clinical Concern  The highest grade achieved is [employer] with the overall quality of school experience being described as stressful. The patient is currently  unemployed and speaks [place] as a primary language. The patient has no communication impairments affecting communication. The patient's preference for learning can be described as: can read and write adequately. The patient's hearing is normal.  The patient's vision is impaired and  wears glasses or contacts .       Gabe

## 2017-12-19 NOTE — H&P
INITIAL PSYCHIATRIC EVALUATION            IDENTIFICATION:    Patient Name  aAron Olivo   Date of Birth 1995   [facility] [id number]   Medical Record Number  [id number]      Age  23 y.o. PCP None   Admit date:  12/18/2017    Room Number  727/01  @ [facility], [place]   Date of Service  12/19/2017            HISTORY         REASON FOR HOSPITALIZATION:  CC: \"SI depression \". Pt admitted under voluntary basis for severe depression with suicidal ideations and thad proving to be an imminent danger to self and others and an inability to care for self. HISTORY OF PRESENT ILLNESS:    The patient, Aaron Olivo, is a 23 y.o. WHITE OR  female with a past psychiatric history significant for depression , who presents at this time with complaints of (and/or evidence of) the following emotional symptoms: suicidal thoughts/threats. Additional symptomatology include anxiety. The above symptoms have been present for 7 months . These symptoms are of severe severity. These symptoms are constant  in nature. The patient's condition has been precipitated by homesickness and psychosocial stressors (benzodiazepine overuse ). Patient's condition made worse by treatment noncompliance. BAL=0. ALLERGIES: No Known Allergies   MEDICATIONS PRIOR TO ADMISSION:   Prescriptions Prior to Admission   Medication Sig    diazePAM (VALIUM) 5 mg tablet Take 5 mg by mouth daily as needed for Anxiety. Indications: anxiety    sertraline (ZOLOFT) 100 mg tablet Take 100 mg by mouth daily.  methylphenidate HCl (RITALIN) 5 mg tablet Take 15 mg by mouth two (2) times a day. PAST MEDICAL HISTORY:   Past Medical History:   Diagnosis Date    ADHD     Asthma     Depression     Psychiatric disorder     depression    Sleep disorder     Suicidal thoughts    History reviewed. No pertinent surgical history.    SOCIAL HISTORY:    Social History     Social History    Marital status: SINGLE     Spouse name: N/A    Number of children: N/A  Years of education: N/A     Occupational History    Not on file. 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 Pt is depressed and has SI with no plan. She is from [facility] ,and is homesick. She lives alone. FAMILY HISTORY:    History reviewed. No pertinent family history. REVIEW OF SYSTEMS:   Psychological ROS: positive for - behavioral disorder  Respiratory ROS: no cough, shortness of breath, or wheezing  Cardiovascular ROS: no chest pain or dyspnea on exertion  Pertinent items are noted in the History of Present Illness. All other Systems reviewed and are considered negative. MENTAL STATUS EXAM & VITALS     MENTAL STATUS EXAM (MSE):    MSE FINDINGS ARE WITHIN NORMAL LIMITS (WNL) UNLESS OTHERWISE STATED BELOW. ( ALL OF THE BELOW CATEGORIES OF THE MSE HAVE BEEN REVIEWED (reviewed 12/19/2017) AND UPDATED AS DEEMED APPROPRIATE )  General Presentation age appropriate, evasive and guarded   Orientation oriented to time, place and person   Vital Signs  See below (reviewed 12/19/2017); Vital Signs (BP, Pulse, & Temp) are within normal limits if not listed below.    Gait and Station Stable/steady, no ataxia   Musculoskeletal System No extrapyramidal symptoms (EPS); no abnormal muscular movements or Tardive Dyskinesia (TD); muscle strength and tone are within normal limits   Language No aphasia or dysarthria   Speech:  hypoverbal   Thought Processes logical; normal rate of thoughts; poor abstract reasoning/computation   Thought Associations circumstantial   Thought Content free of delusions   Suicidal Ideations contracts for safety   Homicidal Ideations none   Mood:  anxious    Affect: mood-congruent   Memory recent  fair   Memory remote:  fair   Concentration/Attention:  hypervigilance   Fund of Knowledge average   Insight:  poor   Reliability poor   Judgment:  poor          VITALS:     Patient Vitals for the past 24 hrs:   Temp Pulse Resp BP SpO2   12/19/17 0715 98.3 °F (36.8 °C) 75 16 122/82 97 %   12/18/17 1935 98.4 °F (36.9 °C) 73 18 125/76 99 %   12/18/17 1854 98.2 °F (36.8 °C) 74 16 131/76 100 %   12/18/17 1628 98 °F (36.7 °C) 88 16 123/73 97 %     Wt Readings from Last 3 Encounters:   12/18/17 87.5 kg (193 lb)     Temp Readings from Last 3 Encounters:   12/19/17 98.3 °F (36.8 °C)     BP Readings from Last 3 Encounters:   12/19/17 122/82     Pulse Readings from Last 3 Encounters:   12/19/17 75            DATA     LABORATORY DATA:  Labs Reviewed   METABOLIC PANEL, COMPREHENSIVE - Abnormal; Notable for the following:        Result Value    A-G Ratio 1.0 (*)     All other components within normal limits   URINALYSIS W/ REFLEX CULTURE - Abnormal; Notable for the following:     Appearance CLOUDY (*)     All other components within normal limits   ACETAMINOPHEN - Abnormal; Notable for the following:     Acetaminophen level <2 (*)     All other components within normal limits   SALICYLATE - Abnormal; Notable for the following:     Salicylate level <9.0 (*)     All other components within normal limits   CBC WITH AUTOMATED DIFF   ETHYL ALCOHOL   DRUG SCREEN, URINE   SAMPLES BEING HELD   TSH 3RD GENERATION   HCG URINE, QL. - POC   POC URINE PREGNANCY TEST     Admission on 12/18/2017   Component Date Value Ref Range Status    WBC 12/18/2017 7.1  3.6 - 11.0 K/uL Final    RBC 12/18/2017 4.75  3.80 - 5.20 M/uL Final    HGB 12/18/2017 13.4  11.5 - 16.0 g/dL Final    HCT 12/18/2017 41.1  35.0 - 47.0 % Final    MCV 12/18/2017 86.5  80.0 - 99.0 FL Final    MCH 12/18/2017 28.2  26.0 - 34.0 PG Final    MCHC 12/18/2017 32.6  30.0 - 36.5 g/dL Final    RDW 12/18/2017 12.6  11.5 - 14.5 % Final    PLATELET 32/28/6829 464  150 - 400 K/uL Final    NEUTROPHILS 12/18/2017 69  32 - 75 % Final    LYMPHOCYTES 12/18/2017 24  12 - 49 % Final    MONOCYTES 12/18/2017 5  5 - 13 % Final    EOSINOPHILS 12/18/2017 2  0 - 7 % Final    BASOPHILS 12/18/2017 0  0 - 1 % Final    ABS. NEUTROPHILS 12/18/2017 4.9  1.8 - 8.0 K/UL Final    ABS. LYMPHOCYTES 12/18/2017 1.7  0.8 - 3.5 K/UL Final    ABS. MONOCYTES 12/18/2017 0.4  0.0 - 1.0 K/UL Final    ABS. EOSINOPHILS 12/18/2017 0.1  0.0 - 0.4 K/UL Final    ABS. BASOPHILS 12/18/2017 0.0  0.0 - 0.1 K/UL Final    Sodium 12/18/2017 138  136 - 145 mmol/L Final    Potassium 12/18/2017 3.9  3.5 - 5.1 mmol/L Final    Chloride 12/18/2017 103  97 - 108 mmol/L Final    CO2 12/18/2017 30  21 - 32 mmol/L Final    Anion gap 12/18/2017 5  5 - 15 mmol/L Final    Glucose 12/18/2017 93  65 - 100 mg/dL Final    BUN 12/18/2017 8  6 - 20 MG/DL Final    Creatinine 12/18/2017 0.67  0.55 - 1.02 MG/DL Final    BUN/Creatinine ratio 12/18/2017 12  12 - 20   Final    GFR est AA 12/18/2017 >60  >60 ml/min/1.73m2 Final    GFR est non-AA 12/18/2017 >60  >60 ml/min/1.73m2 Final    Calcium 12/18/2017 9.4  8.5 - 10.1 MG/DL Final    Bilirubin, total 12/18/2017 0.3  0.2 - 1.0 MG/DL Final    ALT (SGPT) 12/18/2017 27  12 - 78 U/L Final    AST (SGOT) 12/18/2017 16  15 - 37 U/L Final    Alk.  phosphatase 12/18/2017 99  45 - 117 U/L Final    Protein, total 12/18/2017 7.8  6.4 - 8.2 g/dL Final    Albumin 12/18/2017 3.9  3.5 - 5.0 g/dL Final    Globulin 12/18/2017 3.9  2.0 - 4.0 g/dL Final    A-G Ratio 12/18/2017 1.0* 1.1 - 2.2   Final    Color 12/18/2017 YELLOW/STRAW    Final    Appearance 12/18/2017 CLOUDY* CLEAR   Final    Specific gravity 12/18/2017 1.015  1.003 - 1.030   Final    pH (UA) 12/18/2017 6.0  5.0 - 8.0   Final    Protein 12/18/2017 NEGATIVE   NEG mg/dL Final    Glucose 12/18/2017 NEGATIVE   NEG mg/dL Final    Ketone 12/18/2017 NEGATIVE   NEG mg/dL Final    Bilirubin 12/18/2017 NEGATIVE   NEG   Final    Blood 12/18/2017 NEGATIVE   NEG   Final    Urobilinogen 12/18/2017 0.2  0.2 - 1.0 EU/dL Final    Nitrites 12/18/2017 NEGATIVE   NEG   Final    Leukocyte Esterase 12/18/2017 NEGATIVE   NEG   Final    WBC 12/18/2017 0-4  0 - 4 /hpf Final    RBC 12/18/2017 5-10  0 - 5 /hpf Final    Epithelial cells 12/18/2017 FEW  FEW /lpf Final    Bacteria 12/18/2017 NEGATIVE   NEG /hpf Final    UA:UC IF INDICATED 12/18/2017 CULTURE NOT INDICATED BY UA RESULT  CNI   Final    Hyaline cast 12/18/2017 2-5  0 - 5 /lpf Final    Acetaminophen level 12/18/2017 <2* 10 - 30 ug/mL Final    Salicylate level 20/21/1731 <1.7* 2.8 - 20.0 MG/DL Final    ALCOHOL(ETHYL),SERUM 12/18/2017 <10  <10 MG/DL Final    AMPHETAMINES 12/18/2017 NEGATIVE   NEG   Final    BARBITURATES 12/18/2017 NEGATIVE   NEG   Final    BENZODIAZEPINES 12/18/2017 NEGATIVE   NEG   Final    COCAINE 12/18/2017 NEGATIVE   NEG   Final    METHADONE 12/18/2017 NEGATIVE   NEG   Final    OPIATES 12/18/2017 NEGATIVE   NEG   Final    PCP(PHENCYCLIDINE) 12/18/2017 NEGATIVE   NEG   Final    THC (TH-CANNABINOL) 12/18/2017 NEGATIVE   NEG   Final    Drug screen comment 12/18/2017 (NOTE)   Final    Pregnancy test,urine (POC) 12/18/2017 NEGATIVE   NEG   Final    TSH 12/18/2017 1.02  0.36 - 3.74 uIU/mL Final        RADIOLOGY REPORTS:  No results found for this or any previous visit. No results found.            MEDICATIONS       ALL MEDICATIONS  Current Facility-Administered Medications   Medication Dose Route Frequency    methylphenidate HCl (RITALIN) tablet 15 mg  15 mg Oral BID    [START ON 12/20/2017] sertraline (ZOLOFT) tablet 150 mg  150 mg Oral DAILY    LORazepam (ATIVAN) tablet 0.5 mg  0.5 mg Oral BID PRN    hydrOXYzine HCl (ATARAX) tablet 50 mg  50 mg Oral QID PRN    ziprasidone (GEODON) 20 mg in sterile water (preservative free) 1 mL injection  20 mg IntraMUSCular BID PRN    OLANZapine (ZyPREXA) tablet 5 mg  5 mg Oral Q6H PRN    benztropine (COGENTIN) tablet 2 mg  2 mg Oral BID PRN    benztropine (COGENTIN) injection 2 mg  2 mg IntraMUSCular BID PRN    LORazepam (ATIVAN) injection 2 mg  2 mg IntraMUSCular Q4H PRN    zolpidem (AMBIEN) tablet 5 mg  5 mg Oral QHS PRN    acetaminophen (TYLENOL) tablet 650 mg  650 mg Oral Q4H PRN    ibuprofen (MOTRIN) tablet 400 mg  400 mg Oral Q8H PRN    magnesium hydroxide (MILK OF MAGNESIA) 400 mg/5 mL oral suspension 30 mL  30 mL Oral DAILY PRN    nicotine (NICODERM CQ) 21 mg/24 hr patch 1 Patch  1 Patch TransDERmal DAILY PRN      SCHEDULED MEDICATIONS  Current Facility-Administered Medications   Medication Dose Route Frequency    methylphenidate HCl (RITALIN) tablet 15 mg  15 mg Oral BID    [START ON 12/20/2017] sertraline (ZOLOFT) tablet 150 mg  150 mg Oral DAILY                ASSESSMENT & PLAN        The patient, Soren Marino, is a 23 y.o.  female who presents at this time for treatment of the following diagnoses:  Patient Active Hospital Problem List:   Depression (12/18/2017)    Assessment: sadness, hopelessness, helplessness, poor energy ,insomnia     Plan: Augment/increase antidepressant            A coordinated, multidisplinary treatment team (includes the nurse, unit pharmcist,  and writer) round was conducted for this initial evaluation with the patient present. The following regarding medications was addressed during rounds with patient: zoloft and wellbutrin  the risks and benefits of the proposed medication. The patient was given the opportunity to ask questions. Informed consent given to the use of the above medications. I will continue to adjust psychiatric and non-psychiatric medications (see above \"medication\" section and orders section for details) as deemed appropriate & based upon diagnoses and response to treatment. I have reviewed admission (and previous/old) labs and medical tests in the EHR and or transferring hospital documents.  I will continue to order blood tests/labs and diagnostic tests as deemed appropriate and review results as they become available (see orders for details). I have reviewed old psychiatric and medical records available in the EHR. I Will order additional psychiatric records from other institutions to further elucidate the nature of patient's psychopathology and review once available. I will gather additional collateral information from friends, family and o/p treatment team to further elucidate the nature of patient's psychopathology and baselline level of psychiatric functioning.       ESTIMATED LENGTH OF STAY:    5-8 days        STRENGTHS:  Exercising self-direction/Resourceful, Access to housing/residential stability and Knowledge of medications                                        SIGNED:    Renee Day MD  12/19/2017

## 2017-12-19 NOTE — BH NOTES
Patient admitted voluntarily to General Inpatient Psychiatry, under the services of . Patient currently admits to suicidal ideation. Patient currently denies homicidal ideation. Patient verbally contracts for safety. Patient denies psychotic symptoms. Pt admits to social ETOH use. Pt denies drug use.

## 2017-12-19 NOTE — PROGRESS NOTES
[facility] 60  Master Treatment Plan for Efren Cost    Date Treatment Plan Initiated: 12/19/17    Treatment Plan Modalities:  Type of Modality Amount  (x minutes) Frequency (x/week) Duration (x days) Name of Responsible Staff   [address] [facility] meetings to encourage peer interactions 15 7 32 hospitals psychotherapy to assist in building coping skills and internal controls 60 7 1 Kirill Aaron   Therapeutic activity groups to build coping skills 60 7 1 Kirill Aaron   Psychoeducation in group setting to address:   Medication education   15 7 1400 [facility] skills         Relaxation techniques         Symptom management         Discharge planning   60 2 255 [facility]    60 2 [address]   60 1 1 [place]er   Recovery/AA/NA         Physician medication management   15 7 1 Dr. Negrito Jennings   Family meeting/discharge planning                                                Problem: Depressed Mood (Adult/Pediatric) these goals will be met by 12/22/17  Goal: *STG: Participates in treatment plan  Outcome: Progressing Towards Goal  Review meds, out on unit passively engaged. Appears anxious and sad. When asked mood response is \"I don't really know\". Does describe hopelessness and \"I don't want to be alive\". Deflective and vague when discussing admission and school stressors. Pt daily goal is to speak with tx team.   Goal: *STG: Verbalizes anger, guilt, and other feelings in a constructive manor  Outcome: Progressing Towards Goal  hopeless  Goal: *STG: Attends activities and groups  Outcome: Progressing Towards Goal  Passively engaged  Goal: *STG: Demonstrates reduction in symptoms and increase in insight into coping skills/future focused  Outcome: Progressing Towards Goal  Denies SI, no self harming behaviors. Symptoms such as SI resolved, hopelessness remain.  Voices passive interest in learning coping skills   Goal: Interventions  Outcome: Progressing Towards Goal  Staff focus is on coping skills [facility]    [phone]: tearful after talking to school. They informed him they called his parents in [place] about her admission. Patient states this was \"my worse fear\", \"I am an adult how could they do this to me\". Does not voice suicide thoughts at this time. States she is fearful at returning home and is having difficulty staying in the present. Staff offered support and reassurance. Review coping skills that are healthy and effective for her such as playing a game with a peer, walking and working on a puzzle. Pt stop crying and states she feels improved since sharing with staff.

## 2017-12-19 NOTE — BH NOTES
TRANSFER - IN REPORT:    Verbal report received from Joe(name) on Elsa Sharma  being received from ED(unit) for routine progression of care      Report consisted of patients Situation, Background, Assessment and   Recommendations(SBAR). Information from the following report(s) SBAR was reviewed with the receiving nurse. Opportunity for questions and clarification was provided. Assessment completed upon patients arrival to unit and care assumed.

## 2017-12-19 NOTE — PROGRESS NOTES
Admission Medication Reconciliation:    Information obtained from:  Patient    Comments/Recommendations: All medications/allergies have been reviewed and updated; last medication administration times reviewed and recorded. Changes made to Prior to Admission (PTA) Medication List:   ?   Medications Added:   - Sertraline and Ritalin   ? Medications Changed:   - None   ? Medications Removed:   - None       Significant PMH/Disease States:   Past Medical History:   Diagnosis Date    ADHD     Asthma     Depression     Psychiatric disorder     depression       Chief Complaint for this Admission:    Chief Complaint   Patient presents with    Mental Health Problem    Suicidal       Allergies:  Review of patient's allergies indicates no known allergies. Prior to Admission Medications:   Prior to Admission Medications   Prescriptions Last Dose Informant Patient Reported? Taking? methylphenidate HCl (RITALIN) 5 mg tablet   Yes Yes   Sig: Take 15 mg by mouth two (2) times a day. sertraline (ZOLOFT) 100 mg tablet 12/18/2017 at AM  Yes Yes   Sig: Take 100 mg by mouth daily. Facility-Administered Medications: None      Thank you for allowing pharmacy to participate in the coordination of this patient's care. If you have any other questions, please contact the medication reconciliation pharmacist at [phone]. Ligia Zarate, Pharm. D. Admission Medication Reconciliation:    Information obtained from:  [address] [place] [address]    Comments/Recommendations: Updated PTA meds/reviewed patient's allergies. 1)  Added:       - diazepam 5mg - #10 for 10 day supply on 12/16/17         Prior to Admission Medications:   Prior to Admission Medications   Prescriptions Last Dose Informant Patient Reported? Taking?   diazePAM (VALIUM) 5 mg tablet   Yes Yes   Sig: Take 5 mg by mouth daily as needed for Anxiety.  Indications: anxiety   methylphenidate HCl (RITALIN) 5 mg tablet   Yes Yes   Sig: Take 15 mg by mouth two (2) times a day.   sertraline (ZOLOFT) 100 mg tablet 12/18/2017 at AM  Yes Yes   Sig: Take 100 mg by mouth daily.       Facility-Administered Medications: None     Daniel Hilliard, PharmD, BCPP, [facility] Specialist, [facility]
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
## 2017-12-20 PROCEDURE — 74011250637 HC RX REV CODE- 250/637: Performed by: PSYCHIATRY & NEUROLOGY

## 2017-12-20 PROCEDURE — 65220000003 HC RM SEMIPRIVATE PSYCH

## 2017-12-20 RX ADMIN — METHYLPHENIDATE HYDROCHLORIDE 15 MG: 5 TABLET ORAL at 08:26

## 2017-12-20 RX ADMIN — SERTRALINE HYDROCHLORIDE 150 MG: 50 TABLET ORAL at 08:26

## 2017-12-20 RX ADMIN — METHYLPHENIDATE HYDROCHLORIDE 15 MG: 5 TABLET ORAL at 14:01

## 2017-12-20 RX ADMIN — HYDROXYZINE HYDROCHLORIDE 50 MG: 50 TABLET, FILM COATED ORAL at 22:18

## 2017-12-20 NOTE — PROGRESS NOTES
Problem: Depressed Mood (Adult/Pediatric)  Goal: *STG: Attends activities and groups  Outcome: Progressing Towards Goal  Pt has been visible in milieu all afternoon. Sitting with peers working on [employer]. Met with  for an extended period. Denying thoughts of self harm. Behavior and conversation appropriate. Will continue to monitor.

## 2017-12-20 NOTE — BH NOTES
GROUP THERAPY PROGRESS NOTE    Bernadette Vera is participating in [place].      Group time: 15 minutes    Personal goal for participation: make call to a friend    Goal orientation: community    Group therapy participation: active    Therapeutic interventions reviewed and discussed: yes    Impression of participation: engaged

## 2017-12-20 NOTE — INTERDISCIPLINARY ROUNDS
Behavioral Health Interdisciplinary Rounds     Patient Name: Wai Noriega  Age: 23 y.o.   Room/Bed:  3/  Primary Diagnosis: Depression   Admission Status: Voluntary     Readmission within 30 days: no  Power of  in place: no  Patient requires a blocked bed: no          Reason for blocked bed: n/a    VTE Prophylaxis: Not indicated  Flu vaccine given : no - refused   Mobility needs/Fall risk: no    Nutritional Plan: no  Consults: no         Labs/Testing due today?: no    Sleep hours: 4.75      Participation in Care/Groups:  yes  Medication Compliant?: Yes  PRNS (last 24 hours): Sleep aid, Antianxiety    Restraints (last 24 hours):  no  Substance Abuse:  no  CIWA (range last 24 hours):  COWS (range last 24 hours):   Alcohol screening (AUDIT) completed -  AUDIT Score: 2  If applicable, date SBIRT discussed in treatment team AND documented:   Tobacco - patient is a smoker: no   Date tobacco education completed by RN: n/a  24 hour chart check complete: yes     Patient goal(s) for today:   Treatment team focus/goals:   Progress note     LOS:  2  Expected LOS:     Financial concerns/prescription coverage:    Date of last family contact:       Family requesting physician contact today:    Discharge plan:   Guns in the home:      Outpatient provider(s):     Participating treatment team members: Wai Noriega, * (assigned SW),

## 2017-12-20 NOTE — PROGRESS NOTES
Problem: Depressed Mood (Adult/Pediatric)  Goal: *STG: Verbalizes anger, guilt, and other feelings in a constructive manor  Outcome: Progressing Towards Goal  Pt able to verbalize feelings in a constructive manor. Pt vomited x1 . Given gingerale and water. Stated she is feeling better at this time. Goal: *STG: Attends activities and groups  Outcome: Progressing Towards Goal  Attended the Community Group in the morning. Goal: *STG: Complies with medication therapy  Outcome: Progressing Towards Goal  Taking medications as scheduled today.

## 2017-12-20 NOTE — BH NOTES
The documentation for this period is being entered following the guidelines as defined in the [facility] policy by Dari Barone RN. [phone]

## 2017-12-20 NOTE — PROGRESS NOTES
Problem: Depressed Mood (Adult/Pediatric)  Goal: *STG: Participates in treatment plan  Outcome: Progressing Towards Goal  Pt is anxious, cooperative. Will sit on floor behind bed or in cubby in room to isolate herself. Participates with encouragement. Attends groups. Accepts medications. Pt was tearful after visitation with antonio of school and asked visitor to leave. Tearful on phone. Pt expresses fear that she will be forced to live with her parents and be \"locked up\".

## 2017-12-20 NOTE — BH NOTES
Received pt up in room crying. \" I can't feel my body. Staff encourage deep breathing exercises with no progress. Ativan 1 mg given to pt. NAD. Q-15 checks for safety.     -24 hr chart review completed

## 2017-12-20 NOTE — BH NOTES
GROUP THERAPY PROGRESS NOTE    Brayan Fair participated in a morning Process Group on the General Unit with a focus identifying feelings,   planning for the day, and learning more about DBT concepts on \"Emotion Regulation. \"    .   Group time: 60 minutes. Personal goal for participation: To increase the capacity to improve ones mood, set personal goals,   and understand more about basic activities to help regulate emotions. Goal orientation: The patients will be able to identify their feelings and develop a plan for   structuring their day. They were also presented with a summary sheet on emotional regulation,   in regards to focusing on one goal per day, taking physical care of oneself, and recognizing   and/or building positive experiences. The didactic portion of the session focused on these three  concepts; 1) defining and focusing on one goal per day; 2) taking care of ones physical   maintenance and basic needs  sleep, nutrition, and exercise; and 3) finding and building on   positive experiences. Group therapy participation: With prompting, this patient participated in the group. Therapeutic interventions reviewed and discussed: The group members were asked to   identify an emotion they are having and/or let the group know what they want to focus on for the   day as they continue to make discharge plans. The group members reviewed three DBT   suggestions regarding emotional regulation, in regards to focusing on one goal per day, taking   physical care of oneself, and recognizing and/or building positive experiences. It was suggested   that these three concepts can be seen as headings for their list of coping skills. The group   members were also provided worksheets on the topic discussed for their review and use on   their own time. Impression of participation: The patient said she was \"OK' and that she was looking forward  to getting a shower later today.  She also indicated that she thought her medications have   been positive but that she had a question or two to discuss with her attending physician. The patient expressed no current SI/HI and displayed no overt psychotic symptoms in this group. Her affect remained anxious and depressed. Her mood matched her affect.

## 2017-12-21 PROCEDURE — G8996 SWALLOW CURRENT STATUS: HCPCS | Performed by: SPEECH-LANGUAGE PATHOLOGIST

## 2017-12-21 PROCEDURE — 74011250637 HC RX REV CODE- 250/637: Performed by: PSYCHIATRY & NEUROLOGY

## 2017-12-21 PROCEDURE — 65220000003 HC RM SEMIPRIVATE PSYCH

## 2017-12-21 PROCEDURE — G8997 SWALLOW GOAL STATUS: HCPCS | Performed by: SPEECH-LANGUAGE PATHOLOGIST

## 2017-12-21 PROCEDURE — 92610 EVALUATE SWALLOWING FUNCTION: CPT | Performed by: SPEECH-LANGUAGE PATHOLOGIST

## 2017-12-21 PROCEDURE — G8998 SWALLOW D/C STATUS: HCPCS | Performed by: SPEECH-LANGUAGE PATHOLOGIST

## 2017-12-21 RX ADMIN — HYDROXYZINE HYDROCHLORIDE 50 MG: 50 TABLET, FILM COATED ORAL at 21:39

## 2017-12-21 RX ADMIN — METHYLPHENIDATE HYDROCHLORIDE 15 MG: 5 TABLET ORAL at 14:40

## 2017-12-21 RX ADMIN — SERTRALINE HYDROCHLORIDE 150 MG: 50 TABLET ORAL at 08:47

## 2017-12-21 RX ADMIN — METHYLPHENIDATE HYDROCHLORIDE 15 MG: 5 TABLET ORAL at 08:46

## 2017-12-21 NOTE — PROGRESS NOTES
Problem: Depressed Mood (Adult/Pediatric)  Goal: *STG: Complies with medication therapy  Outcome: Progressing Towards Goal  Resting in bed with eyes closed, no complaints, no distress noted. Safety measures in place, will continue to monitor.

## 2017-12-21 NOTE — BH NOTES
PSYCHIATRIC PROGRESS NOTE         Patient Name  Pippa Martinez   Date of Birth 1995   [facility] [id number]   Medical Record Number  [id number]      Age  23 y.o. PCP None   Admit date:  12/18/2017    Room Number  733/01  @ [facility]   Date of Service  12/21/2017          PSYCHOTHERAPY SESSION NOTE:  Length of psychotherapy session: 45 minutes    Main condition/diagnosis/issues treated during session today, 12/21/2017 : anxiety management, coping skills, medication management    I employed Cognitive Behavioral therapy techniques, Reality-Oriented psychotherapy, as well as supportive psychotherapy in regards to various ongoing psychosocial stressors, including the following: pre-admission and current problems; housing issues; occupational issues; academic issues; medical issues; and stress of hospitalization. Interpersonal relationship issues and psychodynamic conflicts explored. Attempts made to alleviate maladaptive patterns. We, also, worked on issues of denial & effects of substance dependency/use     Overall, patient is not progressing    Treatment Plan Update (reviewed an updated 12/21/2017) : I will modify psychotherapy tx plan by implementing more stress management strategies, building upon cognitive behavioral techniques, increasing coping skills, as well as shoring up psychological defenses). n extended energy and skill set was needed to engage pt in psychotherapy due to some of the following: resistiveness, complexity, negativity, confrontational nature, hostile behaviors, and/or severe abnormalities in thought processes/psychosis resulting in the loss of expressive/receptive language communication skills. E & M PROGRESS NOTE:         HISTORY       CC:  \"SI depression \"  HISTORY OF PRESENT ILLNESS/INTERVAL HISTORY:  (reviewed/updated 12/21/2017).   per initial evaluation:     Pippa Martinez presents/reports/evidences the following emotional symptoms today, 12/21/2017:depression and suicidal thoughts/threats. The above symptoms have been present for 8 months . These symptoms are of severe severity. The symptoms are constant  in nature. Additional symptomatology and features include anxiety. 12/21/17- Ig medication and group compliant. Cotinues to speak in whispers nd have constricted affect. Still has SI      SIDE EFFECTS: (reviewed/updated 12/21/2017)  None reported or admitted to. No noted toxicity with use of Depakote/Tegretol/lithium/Clozaril/TCAs   ALLERGIES:(reviewed/updated 12/21/2017)  No Known Allergies   MEDICATIONS PRIOR TO ADMISSION:(reviewed/updated 12/21/2017)  Prescriptions Prior to Admission   Medication Sig    diazePAM (VALIUM) 5 mg tablet Take 5 mg by mouth daily as needed for Anxiety. Indications: anxiety    sertraline (ZOLOFT) 100 mg tablet Take 100 mg by mouth daily.  methylphenidate HCl (RITALIN) 5 mg tablet Take 15 mg by mouth two (2) times a day. PAST MEDICAL HISTORY: Past medical history from the initial psychiatric evaluation has been reviewed (reviewed/updated 12/21/2017) with no additional updates (I asked patient and no additional past medical history provided). Past Medical History:   Diagnosis Date    ADHD     Asthma     Depression     Psychiatric disorder     depression    Sleep disorder     Suicidal thoughts    History reviewed. No pertinent surgical history. SOCIAL HISTORY: Social history from the initial psychiatric evaluation has been reviewed (reviewed/updated 12/21/2017) with no additional updates (I asked patient and no additional social history provided). Social History     Social History    Marital status: SINGLE     Spouse name: N/A    Number of children: N/A    Years of education: N/A     Occupational History    Not on file.      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 Pt is depressed and has SI with no plan. She is from [facility] ,and is homesick. She lives alone. FAMILY HISTORY: Family history from the initial psychiatric evaluation has been reviewed (reviewed/updated 12/21/2017) with no additional updates (I asked patient and no additional family history provided). History reviewed. No pertinent family history. REVIEW OF SYSTEMS: (reviewed/updated 12/21/2017)  Appetite:no change from normal   Sleep: no change   All other Review of Systems: Psychological ROS: positive for - behavioral disorder  Respiratory ROS: no cough, shortness of breath, or wheezing  Cardiovascular ROS: no chest pain or dyspnea on exertion         [facility]    MSE FINDINGS ARE WITHIN NORMAL LIMITS (WNL) UNLESS OTHERWISE STATED BELOW. ( ALL OF THE BELOW CATEGORIES OF THE MSE HAVE BEEN REVIEWED (reviewed 12/21/2017) AND UPDATED AS DEEMED APPROPRIATE )  General Presentation age appropriate, cooperative   Orientation oriented to time, place and person   Vital Signs  See below (reviewed 12/21/2017); Vital Signs (BP, Pulse, & Temp) are within normal limits if not listed below.    Gait and Station Stable/steady, no ataxia   Musculoskeletal System No extrapyramidal symptoms (EPS); no abnormal muscular movements or Tardive Dyskinesia (TD); muscle strength and tone are within normal limits   Language No aphasia or dysarthria   Speech:  monotone   Thought Processes logical; normal rate of thoughts; poor abstract reasoning/computation   Thought Associations circumstantial   Thought Content free of delusions   Suicidal Ideations contracts for safety   Homicidal Ideations none   Mood:  sad   Affect:  mood-congruent   Memory recent  fair   Memory remote:  fair Concentration/Attention:  distractable   Fund of Knowledge average   Insight:  limited   Reliability fair   Judgment:  limited          VITALS:     Patient Vitals for the past 24 hrs:   Temp Pulse Resp BP SpO2   12/21/17 0745 98.3 °F (36.8 °C) 85 16 153/78 99 %   12/20/17 2000 98.8 °F (37.1 °C) 85 16 135/78 99 %     Wt Readings from Last 3 Encounters:   12/18/17 87.5 kg (193 lb)     Temp Readings from Last 3 Encounters:   12/21/17 98.3 °F (36.8 °C)     BP Readings from Last 3 Encounters:   12/21/17 153/78     Pulse Readings from Last 3 Encounters:   12/21/17 85            DATA     LABORATORY DATA:(reviewed/updated 12/21/2017)  No results found for this or any previous visit (from the past 24 hour(s)). No results found for: VALF2, VALAC, VALP, VALPR, DS6, CRBAM, CRBAMP, CARB2, XCRBAM  No results found for: LITHM   RADIOLOGY REPORTS:(reviewed/updated 12/21/2017)  No results found.        MEDICATIONS     ALL MEDICATIONS:   Current Facility-Administered Medications   Medication Dose Route Frequency    methylphenidate HCl (RITALIN) tablet 15 mg  15 mg Oral BID    sertraline (ZOLOFT) tablet 150 mg  150 mg Oral DAILY    LORazepam (ATIVAN) tablet 0.5 mg  0.5 mg Oral BID PRN    hydrOXYzine HCl (ATARAX) tablet 50 mg  50 mg Oral QID PRN    ziprasidone (GEODON) 20 mg in sterile water (preservative free) 1 mL injection  20 mg IntraMUSCular BID PRN    OLANZapine (ZyPREXA) tablet 5 mg  5 mg Oral Q6H PRN    benztropine (COGENTIN) tablet 2 mg  2 mg Oral BID PRN    benztropine (COGENTIN) injection 2 mg  2 mg IntraMUSCular BID PRN    LORazepam (ATIVAN) injection 2 mg  2 mg IntraMUSCular Q4H PRN    zolpidem (AMBIEN) tablet 5 mg  5 mg Oral QHS PRN    acetaminophen (TYLENOL) tablet 650 mg  650 mg Oral Q4H PRN    ibuprofen (MOTRIN) tablet 400 mg  400 mg Oral Q8H PRN    magnesium hydroxide (MILK OF MAGNESIA) 400 mg/5 mL oral suspension 30 mL  30 mL Oral DAILY PRN    nicotine (NICODERM CQ) 21 mg/24 hr patch 1 Patch  1 Patch TransDERmal DAILY PRN      SCHEDULED MEDICATIONS:   Current Facility-Administered Medications   Medication Dose Route Frequency    methylphenidate HCl (RITALIN) tablet 15 mg  15 mg Oral BID    sertraline (ZOLOFT) tablet 150 mg  150 mg Oral DAILY          ASSESSMENT & PLAN     DIAGNOSES REQUIRING ACTIVE TREATMENT AND MONITORING: (reviewed/updated 12/21/2017)  Patient Active Hospital Problem List:   Depression (12/18/2017)    Assessment: sadness, hopelessness, helplessness, poor energy and insomnia     Plan: Increase antidepressant and consider augmentation             I will continue to monitor blood levels (Depakote, Tegretol, lithium, clozapine---a drug with a narrow therapeutic index= NTI) and associated labs for drug therapy implemented that require intense monitoring for toxicity as deemed appropriate based on current medication side effects and pharmacodynamically determined drug 1/2 lives. In summary, Laura Miles, is a 23 y.o.  female who presents with a severe exacerbation of the principal diagnosis of Depression  Patient's condition is worsening/not improving/not stable   Patient requires continued inpatient hospitalization for further stabilization, safety monitoring and medication management. I will continue to coordinate the provision of individual, milieu, occupational, group, and substance abuse therapies to address target symptoms/diagnoses as deemed appropriate for the individual patient. A coordinated, multidisplinary treatment team round was conducted with the patient (this team consists of the nurse, psychiatric unit pharmcist,  and writer). Complete current electronic health record for patient has been reviewed today including consultant notes, ancillary staff notes, nurses and psychiatric tech notes. Suicide risk assessment completed and patient deemed to be of low risk for suicide at this time.      The following regarding medications was addressed during rounds with patient:   the risks and benefits of the proposed medication. The patient was given the opportunity to ask questions. Informed consent given to the use of the above medications. Will continue to adjust psychiatric and non-psychiatric medications (see above \"medication\" section and orders section for details) as deemed appropriate & based upon diagnoses and response to treatment. I will continue to order blood tests/labs and diagnostic tests as deemed appropriate and review results as they become available (see orders for details and above listed lab/test results). I will order psychiatric records from previous [facility] hospitals to further elucidate the nature of patient's psychopathology and review once available. I will gather additional collateral information from friends, family and o/p treatment team to further elucidate the nature of patient's psychopathology and baselline level of psychiatric functioning. I certify that this patient's inpatient psychiatric hospital services furnished since the previous certification were, and continue to be, required for treatment that could reasonably be expected to improve the patient's condition, or for diagnostic study, and that the patient continues to need, on a daily basis, active treatment furnished directly by or requiring the supervision of inpatient psychiatric facility personnel. In addition, the hospital records show that services furnished were intensive treatment services, admission or related services, or equivalent services.     EXPECTED DISCHARGE DATE/DAY: TBD     DISPOSITION: Home       Signed By:   Claudetta Bi, MD  12/21/2017

## 2017-12-21 NOTE — INTERDISCIPLINARY ROUNDS
Behavioral Health Interdisciplinary Rounds     Patient Name: Ainsh Zarate  Age: 23 y.o. Room/Bed:  733/  Primary Diagnosis: Depression   Admission Status: Voluntary     Readmission within 30 days: no  Power of  in place: no  Patient requires a blocked bed: no          Reason for blocked bed:     VTE Prophylaxis: Not indicated  Flu vaccine given : no refused  Mobility needs/Fall risk: no    Nutritional Plan: no  Consults:          Labs/Testing due today?: no    Sleep hours: 6.45       Participation in Care/Groups:  yes  Medication Compliant?: yes  PRNS (last 24 hours): Antianxiety    Restraints (last 24 hours):  no  Substance Abuse:  no  CIWA (range last 24 hours):  COWS (range last 24 hours):   Alcohol screening (AUDIT) completed -  AUDIT Score: 2  If applicable, date SBIRT discussed in treatment team AND documented:   Tobacco - patient is a smoker: no   Date tobacco education completed by RN:   24 hour chart check complete: yes     Patient goal(s) for today: Compliance with TX goals and to assist SW with d/c planning  Treatment team focus/goals: Eval meds and to discuss d/c plans for out pt tx  Progress note: Pt was alert, thoughts organized and capable of engaging Team    LOS:  3  Expected LOS:     Financial concerns/prescription coverage:  [id number]  Date of last family contact:  SW spoke Dr Reynolds (2x) to coordinate safe d/s plans    Family requesting physician contact today:  no  Discharge plan: Return school w/ continued out tx @ [facility] and additional service   Guns in the home: N/A      Outpatient provider(s):  [facility] on 12/26/2017 @ 11:00am, [facility]: Dr Eulogio Webber ( psych ) 1/16/2018 @ 15 Noon and Dr Divya Reynolds ( therapist) 1/2/2018 @ 3:00pm    Participating treatment team members: Anish Zarate, CYRUS DarlingD, Kelly RAMSAY and Yohana Sanches RN

## 2017-12-21 NOTE — BH NOTES
GROUP THERAPY PROGRESS NOTE    Justine Paulino participated in an Afternoon Coping Skills Group on the General Unit, with a focus on singing seasonal songs and building positive experiences as a coping skill. Group time: 30 minutes. Personal goal for participation: To increase the capacity to shift ones mood, prepare for the rest of the day, and share in group singing. Goal orientation: The patient will participate in group singing. Group therapy participation: When prompted, this patient actively participated in the group. Therapeutic interventions reviewed and discussed: The group members were joined in singing several songs and talk about what ideas and thoughts came up for them during the song. Impression of participation: The patient asked for a song sheet and sang quietly with many of the songs. She also played a ukulele for a majority of the songs and would smile between the songs. She said she was grateful for the opportunity to participate in the music. Her affect was not as depressed or anxious as she was this morning. Her mood matched her affect.

## 2017-12-21 NOTE — PROGRESS NOTES
Speech Pathology bedside swallow evaluation/discharge  Patient: Aidan Rodriguez (54 y.o. female)  Date: 12/21/2017  Primary Diagnosis: Depression        Precautions:     ASSESSMENT :  Based on the objective data described below, the patient presents with functional oropharyngeal swallow with no overt s/s of aspiration. Patient denies dysphagia but does report sensation of \"lump in the throat\" which does not clear with eating/drinking. ?reflux. This is a new onset since this morning. Skilled therapy provided by a speech-language pathologist is not indicated at this time. PLAN :  Recommendations:  1. Regular diet/thin liquids  2. Straws OK  3. Consider trial of reflux medication if symptom does not resolve on its own in a couple days    Discharge Recommendations: None     SUBJECTIVE:   Patient stated it's just annoying. \" NAD.  Patient sitting up at table eating lunch upon SLP arrival.     OBJECTIVE:     Past Medical History:   Diagnosis Date    ADHD     Asthma     Depression     Psychiatric disorder     depression    Sleep disorder     Suicidal thoughts      Prior Level of Function/Home Situation:   Home Situation  Home Environment: Private residence  # Steps to Enter: 0  One/Two Story Residence: One story  Living Alone: No  Support Systems: None  Patient Expects to be Discharged to[de-identified] Private residence  Current DME Used/Available at Home: None  Diet prior to admission: Regular  Current Diet:  Regular  Cognitive and Communication Status:  Neurologic State: Alert  Orientation Level: Oriented X4  Cognition: Appropriate decision making, Appropriate for age attention/concentration, Follows commands  Perception: Appears intact  Perseveration: No perseveration noted  Safety/Judgement: Awareness of environment, Insight into deficits  Oral Assessment:  Oral Assessment  Labial: No impairment  Dentition: Natural;Intact  Oral Hygiene: moist oral mucosa  Lingual: No impairment  Mandible: No impairment  P.O. Trials:  Patient Position: Upright in chair  Vocal quality prior to P.O.: No impairment  Consistency Presented: All consistencies; Thin liquid;Mixed consistency; Solid  How Presented: Self-fed/presented;Straw;Successive swallows     Bolus Acceptance: No impairment  Bolus Formation/Control: No impairment     Propulsion: No impairment  Oral Residue: None  Initiation of Swallow: No impairment  Laryngeal Elevation: Functional  Aspiration Signs/Symptoms: None  Pharyngeal Phase Characteristics: No impairment, issues, or problems         Oral Phase Severity: No impairment  Pharyngeal Phase Severity : No impairment  NOMS:   The NOMS functional outcome measure was used to quantify this patient's level of swallowing impairment. Based on the NOMS, the patient was determined to be at level 7 for swallow function     G Codes: In compliance with CMSs Claims Based Outcome Reporting, the following G-code set was chosen for this patient based the use of the NOMS functional outcome to quantify this patient's level of swallowing impairment. Using the NOMS, the patient was determined to be at level 7 for swallow function which correlates with the CH= 0% level of severity. Based on the objective assessment provided within this note, the current, goal, and discharge g-codes are as follows:    Swallow  Swallowing:   Swallow Current Status CH= 0%   Swallow Goal Status CH= 0%   Swallow D/C Status CH= 0%      NOMS Swallowing Levels:  Level 1 (CN): NPO  Level 2 (CM): NPO but takes consistency in therapy  Level 3 (CL): Takes less than 50% of nutrition p.o. and continues with nonoral feedings; and/or safe with mod cues; and/or max diet restriction  Level 4 (CK):  Safe swallow but needs mod cues; and/or mod diet restriction; and/or still requires some nonoral feeding/supplements  Level 5 (CJ): Safe swallow with min diet restriction; and/or needs min cues  Level 6 (CI): Independent with p.o.; rare cues; usually self cues; may need to avoid some foods or needs extra time  Level 7 [facility]): Independent for all p.o.  ASH. (2003). National Outcomes Measurement System (NOMS): Adult Speech-Language Pathology User's Guide. Pain:  Pain Scale 1: Numeric (0 - 10)  Pain Intensity 1: 0     After treatment:   [x] Patient left in no apparent distress sitting up in chair  [] Patient left in no apparent distress in bed  [] Call bell left within reach  [x] Nursing notified  [] Caregiver present  [] Bed alarm activated    COMMUNICATION/EDUCATION:   The patients plan of care including findings, recommendations, and recommended diet changes were discussed with: Registered Nurse.[] Posted safety precautions in patient's room. [x] Patient/family have participated as able and agree with findings and recommendations. [] Patient is unable to participate in plan of care at this time. Thank you for this referral.  Katelyn Hamilton.  Mabel Woodard, MS, CCC-SLP, BCS-S  Time Calculation: 12 mins

## 2017-12-21 NOTE — BH NOTES
CM Note: D/C plans completed with the assistance of the pt and Dr Yehuda flowers/ Jarett Ochoa of  [facility]  [address] [place] will begin on 12/26/017 @ 11:00 am  Pt .'s tentative d/s date:  12/25/2017   Pt will return to her dorm   All parties have agreed to this plan

## 2017-12-21 NOTE — BH NOTES
GROUP THERAPY PROGRESS NOTE    Ananya Gonsalez participated in the General Unit's Process Group, with a focus identifying feelings and planning for their inpatient stay and their aftercare plans. Group time: 55 minutes. Personal goal for participation: To increase the capacity to improve ones mood and structure. Goal orientation: The patient will be able to identify their feelings, develop a plan for structuring their day, and discharge planning. Group therapy participation: With prompting, this patient participated in the group. Therapeutic interventions reviewed and discussed: The group members were asked to introduce themselves to each other and to see if they could identify an emotion they are having and/or let the group know what they want to focus on for the day as they continue to make discharge plans. A handout was distributed and discussed. The handout focused on discharge goals. It asked the reader to thinki about a cloud with looming trouble in your life and to draw or write three smaller obstacles and any larger factor that might zap ones ability to stay with ones aftercare commitments. The patients were asked to consider what protection they might have to prevent a relapse. Impression of participation: The patient spoke up without being prompted today. She said she thought she was doing, \"OK. \" She occasionally smiled when responding to a peer. She added that she may be starting on a new medication today and that she has been practicing reaching out to peers on the unit. She expressed no SI/HI and displayed no overt psychotic symptoms. Her affect was slightly less depressed andt her mood was anxious. She seemed slightly more engaged in the group today than earlier in the week.

## 2017-12-21 NOTE — PROGRESS NOTES
Problem: Depressed Mood (Adult/Pediatric)  Goal: *STG: Complies with medication therapy  Outcome: Progressing Towards Goal  Pt has been taking all scheduled medications. She has not required any PRN medications. Pt complained of \" tingly lips \" and the inability to swallow. Pt has been appropriate in interactions with staff and peers.

## 2017-12-21 NOTE — PROGRESS NOTES
Participated in [facility] group. Topic for discussion was God's unconditional love. [facility] Staff  (Ximena Dugan Patient Care Specialist)   Paging Service [phone])

## 2017-12-22 VITALS
HEIGHT: 67 IN | TEMPERATURE: 97.5 F | OXYGEN SATURATION: 100 % | WEIGHT: 193 LBS | SYSTOLIC BLOOD PRESSURE: 144 MMHG | RESPIRATION RATE: 16 BRPM | DIASTOLIC BLOOD PRESSURE: 88 MMHG | HEART RATE: 83 BPM | BODY MASS INDEX: 30.29 KG/M2

## 2017-12-22 PROCEDURE — 74011250637 HC RX REV CODE- 250/637: Performed by: PSYCHIATRY & NEUROLOGY

## 2017-12-22 RX ORDER — SERTRALINE HYDROCHLORIDE 100 MG/1
150 TABLET, FILM COATED ORAL DAILY
Qty: 45 TAB | Refills: 0 | Status: SHIPPED | OUTPATIENT
Start: 2017-12-22

## 2017-12-22 RX ADMIN — SERTRALINE HYDROCHLORIDE 150 MG: 50 TABLET ORAL at 09:44

## 2017-12-22 RX ADMIN — METHYLPHENIDATE HYDROCHLORIDE 15 MG: 5 TABLET ORAL at 09:44

## 2017-12-22 NOTE — BH NOTES
GROUP THERAPY PROGRESS NOTE    Kamlesh Olson is participating in reflection group. Group time: 15 minutes    Personal goal for participation: PT goal was working on a safety plan for after discharge and she did. Goal orientation: personal    Group therapy participation: active    Therapeutic interventions reviewed and discussed: Unit rules and regulations and 15 ways to be happy while coloring.      Impression of participation: active

## 2017-12-22 NOTE — BH NOTES
GROUP THERAPY PROGRESS NOTE    Pippa Martinez [Héctor] participated in a Process Group on the General Unit with a focus identifying feelings,   planning for the day, and learning more about DBT concepts of \"Interpersonal Effectiveness and using the   [address] as a long-term personal treatment plan. .  Group time: 60 minutes. Personal goal for participation: To increase the capacity to improve ones mood, set personal goals,   and understand more about basic activities to successfully state and get ones needs met. Goal orientation: The patients will be able to identify their feelings and develop a goal for   themselves for their day. The didactic portion of the session covered two primary topics  one on interpersonal   effectiveness and the second on the [address] as a long-term personal treatment plan. First, they were also asked to review a handout sheet on interpersonal effectiveness and asking   for something necessary. Three main areas of focus were explored in the interactive didactic   session: 1) defining what it is one wants (describe the current situation, express your feelings   and opinions, assert  yourself by asking and being willing to say No. 2) keeping a good   relationship with the person you are asking (be gentle and courteous in ones approach  no   attacks, threats, or judging; listen and be interested in the other person; validate the other   persons feelings and needs; and use an easy manner  be diplomatic and use humor   when you can); and 3) maintaining ones self-respect (be fair to yourself and to the other   person; no apologies for being alive or making a request at all, stick to your values, and be   truthful  dont lie, act helpless, exaggerate, or make excuses).     Second they were asked to consider filling in on their own after group the following elements   of a DBT house drawing with multiple levels:  1) foundation - values that govern your life;   2) first floor with a door  behaviors would like to manage and feel more control over or areas   you want to change; the door represents an opportunity to list or draw things that you keep   hidden from others; 3) second floor  list or draw emotions you want to experience more often,  more fully, or in a more healthy fashion; 4) third floor  a list of things that make you happy or   want to feel happy about; 5) attic  list or draw what  a Life Michaelene Fuelling would look like. There  is also a roof, where people and things that protect you can be listed. The chimney provides an   opportunity to list ways in which you blow off steam. The billboard allows one to post those things   in one's life they are proud of and the walls of the house provide an opportunity to list those people   and things that provide support. The patients were also provided copies of the DBT summary on  interpersonal effectiveness and the [address] that they could complete on their own. Group therapy participation: With prompting, this patient participated in the group. Therapeutic interventions reviewed and discussed: The group members were asked to   identify an emotion they are having and/or let the group know what they want to focus on for the   day as they continue to make discharge plans. The group members took turns reading and   reviewing the summary sheet on Interpersonal Effectiveness and the elements of the [facility]. Impression of participation: The patient said she was feeling, \"Pretty good. ..better, I'm still anxious. \"    She went on to say that she might be discharged early next week depending how her weekend goes. She also said she has been encouraged to learn more about \"cognitive distortions\" so that  she can begin to recognize them, like negative self-talk, blaming herself, catastrophising, etc.      Her affect is less depressed than on admission but she still remains fairly anxious.    Her mood matched her affect. The patient expressed no current SI/HI and displayed no   overt psychotic symptoms in this group.

## 2017-12-22 NOTE — BH NOTES
Patient refused 1400 dose of ritalin stating, \"it helps me focus, but I have nothing to focus on!\"

## 2017-12-22 NOTE — INTERDISCIPLINARY ROUNDS
Behavioral Health Interdisciplinary Rounds     Patient Name: Emerson Good  Age: 23 y.o. Room/Bed:  733/  Primary Diagnosis: Depression   Admission Status: Voluntary     Readmission within 30 days: no  Power of  in place: no  Patient requires a blocked bed: no          Reason for blocked bed:     VTE Prophylaxis: Not indicated  Flu vaccine given : no-refused   Mobility needs/Fall risk: no    Nutritional Plan: no  Consults: no         Labs/Testing due today?: no    Sleep hours: 7       Participation in Care/Groups:  yes  Medication Compliant?: Yes  PRNS (last 24 hours): None    Restraints (last 24 hours):  no  Substance Abuse:  no  CIWA (range last 24 hours):  COWS (range last 24 hours):   Alcohol screening (AUDIT) completed -  AUDIT Score: 2  If applicable, date SBIRT discussed in treatment team AND documented: n/a  Tobacco - patient is a smoker: no   Date tobacco education completed by RN: n/a  24 hour chart check complete: yes     Patient goal(s) for today:   Treatment team focus/goals:   Progress note : Pt was seen by Tx team. He reported feeling better with sleep. He denied any SI thoughts   Update: Pt asked to be d/c Sw contacted Dr Reynolds w/ [facility] to solicit her input. Dr Reynolds said has spoken top the pt and she is in favor of the d/c. Pt will be monitored by staff and she is always available by phone.  She asked pt be d/c with two week script for her medication ( Zoloft)   Dr Matt Kaur was advised by charge nurse Rosalie Shelton) and she agreed to the plan    LOS:  4  Expected LOS:     Financial concerns/prescription coverage:    Date of last family contact:  SW  has spoken to Dr Demetris Reynolds in regards top progress and d/c    Family requesting physician contact today:  no  Discharge plan: D/C plan to return back to student dorm was approved by Dr Jacob Buck in the home:  No      Outpatient provider(s): Asad Hedrick - Dr Jesús Reynolds ( therapist) , [place] [facility] [address] [place] [address] Program 1/26/2017 11:00 am    Participating treatment team members: Davis Cortes, Dr Princess Gibbs RN, Wiliam Rosario PharmD and Catarino RAMSAY

## 2017-12-22 NOTE — PROGRESS NOTES
Problem: Anxiety-Behavioral Health (Adult/Pediatric)  Goal: *STG: Participates in treatment plan  Outcome: Progressing Towards Goal  Pt participates in all therapeutic activities. Atarax, 50 mg given per pt request for c/o anxiety. Pt resting quietly with her eyes closed at this time.

## 2017-12-22 NOTE — DISCHARGE INSTRUCTIONS
DISCHARGE SUMMARY    Anthony Covarrubias  : 1995  MRN: [id number]    The patient Ulisses Reeves exhibits the ability to control behavior in a less restrictive environment. Patient's level of functioning is improving. No assaultive/destructive behavior has been observed for the past 24 hours. No suicidal/homicidal threat or behavior has been observed for the past 24 hours. There is no evidence of serious medication side effects. Patient has not been in physical or protective restraints for at least the past 24 hours. If weapons involved, how are they secured? No weapons involved. Is patient aware of and in agreement with discharge plan? Yes    Arrangements for medication:  Prescriptions given to patient. Referral for substance abuse treatment? Not applicable. Referral for smoking cessation needed? Not applicable. Copy of discharge instructions to provider?:  [address] @ [phone]    Arrangements for transportation home:  [address] [place] [address] all follow up appointments as scheduled, continue to take prescribed medications per physician instructions. Mental health crisis number:  [phone] or your local mental health crisis line number at [phone].

## 2017-12-22 NOTE — PROGRESS NOTES
Problem: Depressed Mood (Adult/Pediatric)  Goal: *STG: Participates in treatment plan  Outcome: Progressing Towards Goal  Review meds, out on unit social w peers and staff. Mood and affect smiling bright and hopeful. States ready to work on therapy, cognitive distortions worksheets and wrap plan. Talking with school counselor on d/c plans with dates of discharge on MOnday or Tuesday. Does admit to being bored and working on writing and reading. denie sSI, no self harming behaviors. Staff focus is on encouraging progress toward her goals    1400: up to nursing request discharge. Dr. Belkis Mora did not give order for discharge. Social work is calling school to discuss d/c plans. Will discuss results with Dr. Belkis Mora    1450: Dr. Donna Carlson patient follow up approved d/c plan for today and arranged follow up care for weekend. Dr. Belkis Mora made aware and will order discharge and send scripts to pharmacy via electronic. Pt made aware.

## 2017-12-22 NOTE — BH NOTES
Behavioral Health Transition Record to Provider    Patient Name: Sherman Rico  YOB: 1995  Medical Record Number: [id number]  Date of Admission: 12/18/2017  Date of Discharge: 12/22/2017    Attending Provider: Jose Lance MD  Discharging Provider: Hugo Knox  To contact this individual call [phone] and ask the  to page. If unavailable, ask to be transferred to [facility] Provider on call. [facility] Provider will be available on call 24/7 and during holidays     Primary Care Provider: None    No Known Allergies       H&P Summary Notes      H&P by Jose Lance MD at 12/19/17 9861     Author:  Jose Lance MD Service:  PSYCHIATRY Author Type:  Physician    Filed:  12/19/17 1159 Date of Service:  12/19/17 1058 Status:  Signed    :  Jose Lance MD (Physician)             INITIAL PSYCHIATRIC EVALUATION            IDENTIFICATION:    Patient Name  Sherman Rico   Date of Birth 1995   [facility] [id number]   Medical Record Number  [id number]      Age  23 y.o. PCP None   Admit date:  12/18/2017    Room Number  727/01  @ [facility]   Date of Service  12/19/2017            HISTORY         REASON FOR HOSPITALIZATION:  CC: \"SI depression \". Pt admitted under voluntary basis for severe depression with suicidal ideations and thad proving to be an imminent danger to self and others and an inability to care for self. HISTORY OF PRESENT ILLNESS:    The patient, Sherman Rico, is a 23 y.o. WHITE OR  female with a past psychiatric history significant for depression , who presents at this time with complaints of (and/or evidence of) the following emotional symptoms: suicidal thoughts/threats. Additional symptomatology include anxiety. The above symptoms have been present for 7 months . These symptoms are of severe severity. These symptoms are constant  in nature.   The patient's condition has been precipitated by homesickness and psychosocial stressors (benzodiazepine overuse ). Patient's condition made worse by treatment noncompliance. BAL=0. ALLERGIES: No Known Allergies   MEDICATIONS PRIOR TO ADMISSION:   Prescriptions Prior to Admission   Medication Sig    diazePAM (VALIUM) 5 mg tablet Take 5 mg by mouth daily as needed for Anxiety. Indications: anxiety    sertraline (ZOLOFT) 100 mg tablet Take 100 mg by mouth daily.  methylphenidate HCl (RITALIN) 5 mg tablet Take 15 mg by mouth two (2) times a day. PAST MEDICAL HISTORY:   Past Medical History:   Diagnosis Date    ADHD     Asthma     Depression     Psychiatric disorder     depression    Sleep disorder     Suicidal thoughts    History reviewed. No pertinent surgical history. SOCIAL HISTORY:    Social History     Social History    Marital status: SINGLE     Spouse name: N/A    Number of children: N/A    Years of education: N/A     Occupational History    Not on file. 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 Pt is depressed and has SI with no plan. She is from [facility] ,and is homesick. She lives alone. FAMILY HISTORY:    History reviewed. No pertinent family history. REVIEW OF SYSTEMS:   Psychological ROS: positive for - behavioral disorder  Respiratory ROS: no cough, shortness of breath, or wheezing  Cardiovascular ROS: no chest pain or dyspnea on exertion  Pertinent items are noted in the History of Present Illness. All other Systems reviewed and are considered negative.            MENTAL STATUS EXAM & VITALS     MENTAL STATUS EXAM (MSE):    MSE FINDINGS ARE WITHIN NORMAL LIMITS (WNL) UNLESS OTHERWISE STATED BELOW. ( ALL OF THE BELOW CATEGORIES OF THE MSE HAVE BEEN REVIEWED (reviewed 12/19/2017) AND UPDATED AS DEEMED APPROPRIATE )  General Presentation age appropriate, evasive and guarded   Orientation oriented to time, place and person   Vital Signs  See below (reviewed 12/19/2017); Vital Signs (BP, Pulse, & Temp) are within normal limits if not listed below.    Gait and Station Stable/steady, no ataxia   Musculoskeletal System No extrapyramidal symptoms (EPS); no abnormal muscular movements or Tardive Dyskinesia (TD); muscle strength and tone are within normal limits   Language No aphasia or dysarthria   Speech:  hypoverbal   Thought Processes logical; normal rate of thoughts; poor abstract reasoning/computation   Thought Associations circumstantial   Thought Content[MH1.1] free of delusions[MH1.2]   Suicidal Ideations[MH1.1] contracts for safety[MH1.2]   Homicidal Ideations[MH1.1] none[MH1.2]   Mood:[MH1.1]  anxious[MH1.2]    Affect:[MH1.1]  mood-congruent[MH1.2]   Memory recent[MH1.1]  fair[MH1.2]   Memory remote:[MH1.1]  fair[MH1.2]   Concentration/Attention:[MH1.1]  hypervigilance[MH1.2]   Fund of Knowledge[MH1.1] average[MH1.2]   Insight:[MH1.1]  poor[MH1.2]   Reliability[MH1.1] poor[MH1.2]   Judgment:[MH1.1]  poor[MH1.2]          VITALS:     Patient Vitals for the past 24 hrs:   Temp Pulse Resp BP SpO2   12/19/17 0715 98.3 °F (36.8 °C) 75 16 122/82 97 %   12/18/17 1935 98.4 °F (36.9 °C) 73 18 125/76 99 %   12/18/17 1854 98.2 °F (36.8 °C) 74 16 131/76 100 %   12/18/17 1628 98 °F (36.7 °C) 88 16 123/73 97 %     Wt Readings from Last 3 Encounters:   12/18/17 87.5 kg (193 lb)     Temp Readings from Last 3 Encounters:   12/19/17 98.3 °F (36.8 °C)     BP Readings from Last 3 Encounters:   12/19/17 122/82     Pulse Readings from Last 3 Encounters:   12/19/17 75            DATA     LABORATORY DATA:  Labs Reviewed   METABOLIC PANEL, COMPREHENSIVE - Abnormal; Notable for the following:        Result Value    A-G Ratio 1.0 (*)     All other components within normal limits URINALYSIS W/ REFLEX CULTURE - Abnormal; Notable for the following:     Appearance CLOUDY (*)     All other components within normal limits   ACETAMINOPHEN - Abnormal; Notable for the following:     Acetaminophen level <2 (*)     All other components within normal limits   SALICYLATE - Abnormal; Notable for the following:     Salicylate level <2.0 (*)     All other components within normal limits   CBC WITH AUTOMATED DIFF   ETHYL ALCOHOL   DRUG SCREEN, URINE   SAMPLES BEING HELD   TSH 3RD GENERATION   HCG URINE, QL. - POC   POC URINE PREGNANCY TEST     Admission on 12/18/2017   Component Date Value Ref Range Status    WBC 12/18/2017 7.1  3.6 - 11.0 K/uL Final    RBC 12/18/2017 4.75  3.80 - 5.20 M/uL Final    HGB 12/18/2017 13.4  11.5 - 16.0 g/dL Final    HCT 12/18/2017 41.1  35.0 - 47.0 % Final    MCV 12/18/2017 86.5  80.0 - 99.0 FL Final    MCH 12/18/2017 28.2  26.0 - 34.0 PG Final    MCHC 12/18/2017 32.6  30.0 - 36.5 g/dL Final    RDW 12/18/2017 12.6  11.5 - 14.5 % Final    PLATELET 93/48/4330 188  150 - 400 K/uL Final    NEUTROPHILS 12/18/2017 69  32 - 75 % Final    LYMPHOCYTES 12/18/2017 24  12 - 49 % Final    MONOCYTES 12/18/2017 5  5 - 13 % Final    EOSINOPHILS 12/18/2017 2  0 - 7 % Final    BASOPHILS 12/18/2017 0  0 - 1 % Final    ABS. NEUTROPHILS 12/18/2017 4.9  1.8 - 8.0 K/UL Final    ABS. LYMPHOCYTES 12/18/2017 1.7  0.8 - 3.5 K/UL Final    ABS. MONOCYTES 12/18/2017 0.4  0.0 - 1.0 K/UL Final    ABS. EOSINOPHILS 12/18/2017 0.1  0.0 - 0.4 K/UL Final    ABS.  BASOPHILS 12/18/2017 0.0  0.0 - 0.1 K/UL Final    Sodium 12/18/2017 138  136 - 145 mmol/L Final    Potassium 12/18/2017 3.9  3.5 - 5.1 mmol/L Final    Chloride 12/18/2017 103  97 - 108 mmol/L Final    CO2 12/18/2017 30  21 - 32 mmol/L Final    Anion gap 12/18/2017 5  5 - 15 mmol/L Final    Glucose 12/18/2017 93  65 - 100 mg/dL Final    BUN 12/18/2017 8  6 - 20 MG/DL Final    Creatinine 12/18/2017 0.67  0.55 - 1.02 MG/DL Final  BUN/Creatinine ratio 12/18/2017 12  12 - 20   Final    GFR est AA 12/18/2017 >60  >60 ml/min/1.73m2 Final    GFR est non-AA 12/18/2017 >60  >60 ml/min/1.73m2 Final    Calcium 12/18/2017 9.4  8.5 - 10.1 MG/DL Final    Bilirubin, total 12/18/2017 0.3  0.2 - 1.0 MG/DL Final    ALT (SGPT) 12/18/2017 27  12 - 78 U/L Final    AST (SGOT) 12/18/2017 16  15 - 37 U/L Final    Alk.  phosphatase 12/18/2017 99  45 - 117 U/L Final    Protein, total 12/18/2017 7.8  6.4 - 8.2 g/dL Final    Albumin 12/18/2017 3.9  3.5 - 5.0 g/dL Final    Globulin 12/18/2017 3.9  2.0 - 4.0 g/dL Final    A-G Ratio 12/18/2017 1.0* 1.1 - 2.2   Final    Color 12/18/2017 YELLOW/STRAW    Final    Appearance 12/18/2017 CLOUDY* CLEAR   Final    Specific gravity 12/18/2017 1.015  1.003 - 1.030   Final    pH (UA) 12/18/2017 6.0  5.0 - 8.0   Final    Protein 12/18/2017 NEGATIVE   NEG mg/dL Final    Glucose 12/18/2017 NEGATIVE   NEG mg/dL Final    Ketone 12/18/2017 NEGATIVE   NEG mg/dL Final    Bilirubin 12/18/2017 NEGATIVE   NEG   Final    Blood 12/18/2017 NEGATIVE   NEG   Final    Urobilinogen 12/18/2017 0.2  0.2 - 1.0 EU/dL Final    Nitrites 12/18/2017 NEGATIVE   NEG   Final    Leukocyte Esterase 12/18/2017 NEGATIVE   NEG   Final    WBC 12/18/2017 0-4  0 - 4 /hpf Final    RBC 12/18/2017 5-10  0 - 5 /hpf Final    Epithelial cells 12/18/2017 FEW  FEW /lpf Final    Bacteria 12/18/2017 NEGATIVE   NEG /hpf Final    UA:UC IF INDICATED 12/18/2017 CULTURE NOT INDICATED BY UA RESULT  CNI   Final    Hyaline cast 12/18/2017 2-5  0 - 5 /lpf Final    Acetaminophen level 12/18/2017 <2* 10 - 30 ug/mL Final    Salicylate level 89/65/1057 <1.7* 2.8 - 20.0 MG/DL Final    ALCOHOL(ETHYL),SERUM 12/18/2017 <10  <10 MG/DL Final    AMPHETAMINES 12/18/2017 NEGATIVE   NEG   Final    BARBITURATES 12/18/2017 NEGATIVE   NEG   Final    BENZODIAZEPINES 12/18/2017 NEGATIVE   NEG   Final    COCAINE 12/18/2017 NEGATIVE   NEG   Final    METHADONE 12/18/2017 NEGATIVE   NEG   Final    OPIATES 12/18/2017 NEGATIVE   NEG   Final    PCP(PHENCYCLIDINE) 12/18/2017 NEGATIVE   NEG   Final    THC (TH-CANNABINOL) 12/18/2017 NEGATIVE   NEG   Final    Drug screen comment 12/18/2017 (NOTE)   Final    Pregnancy test,urine (POC) 12/18/2017 NEGATIVE   NEG   Final    TSH 12/18/2017 1.02  0.36 - 3.74 uIU/mL Final        RADIOLOGY REPORTS:  No results found for this or any previous visit. No results found.            MEDICATIONS       ALL MEDICATIONS  Current Facility-Administered Medications   Medication Dose Route Frequency    methylphenidate HCl (RITALIN) tablet 15 mg  15 mg Oral BID    [START ON 12/20/2017] sertraline (ZOLOFT) tablet 150 mg  150 mg Oral DAILY    LORazepam (ATIVAN) tablet 0.5 mg  0.5 mg Oral BID PRN    hydrOXYzine HCl (ATARAX) tablet 50 mg  50 mg Oral QID PRN    ziprasidone (GEODON) 20 mg in sterile water (preservative free) 1 mL injection  20 mg IntraMUSCular BID PRN    OLANZapine (ZyPREXA) tablet 5 mg  5 mg Oral Q6H PRN    benztropine (COGENTIN) tablet 2 mg  2 mg Oral BID PRN    benztropine (COGENTIN) injection 2 mg  2 mg IntraMUSCular BID PRN    LORazepam (ATIVAN) injection 2 mg  2 mg IntraMUSCular Q4H PRN    zolpidem (AMBIEN) tablet 5 mg  5 mg Oral QHS PRN    acetaminophen (TYLENOL) tablet 650 mg  650 mg Oral Q4H PRN    ibuprofen (MOTRIN) tablet 400 mg  400 mg Oral Q8H PRN    magnesium hydroxide (MILK OF MAGNESIA) 400 mg/5 mL oral suspension 30 mL  30 mL Oral DAILY PRN    nicotine (NICODERM CQ) 21 mg/24 hr patch 1 Patch  1 Patch TransDERmal DAILY PRN      SCHEDULED MEDICATIONS  Current Facility-Administered Medications   Medication Dose Route Frequency    methylphenidate HCl (RITALIN) tablet 15 mg  15 mg Oral BID    [START ON 12/20/2017] sertraline (ZOLOFT) tablet 150 mg  150 mg Oral DAILY                ASSESSMENT & PLAN        The patient, Emerson Good, is a 23 y.o.  female who presents at this time for treatment of the following diagnoses:  Patient Active Hospital Problem List:   Depression (12/18/2017)    Assessment:[MH1.1] sadness, hopelessness, helplessness, poor energy ,insomnia[MH1.2]     Plan:[MH1.1] Augment/increase antidepressant[MH1.2]            A coordinated, multidisplinary treatment team (includes the nurse, unit pharmcist,  and writer) round was conducted for this initial evaluation with the patient present. The following regarding medications was addressed during rounds with patient:[MH1.1] zoloft and wellbutrin[MH1.2]  the risks and benefits of the proposed medication. The patient was given the opportunity to ask questions. Informed consent given to the use of the above medications. I will continue to adjust psychiatric and non-psychiatric medications (see above \"medication\" section and orders section for details) as deemed appropriate & based upon diagnoses and response to treatment. I have reviewed admission (and previous/old) labs and medical tests in the EHR and or transferring hospital documents. I will continue to order blood tests/labs and diagnostic tests as deemed appropriate and review results as they become available (see orders for details). I have reviewed old psychiatric and medical records available in the EHR. I Will order additional psychiatric records from other institutions to further elucidate the nature of patient's psychopathology and review once available. I will gather additional collateral information from friends, family and o/p treatment team to further elucidate the nature of patient's psychopathology and baselline level of psychiatric functioning.       ESTIMATED LENGTH OF STAY:[MH1.1]    5-8 days[MH1.2]        STRENGTHS:[MH1.1]  Exercising self-direction/Resourceful, Access to housing/residential stability and Knowledge of medications[MH1.2]                                        SIGNED:    Checo Leblanc MD  12/19/2017[MH1.1]       Revision History       User Key Date/Time User Provider Type Action    > MH1.2 12/19/17 1155 Bentley Goldberg MD Physician Sign     MH1.1 12/19/17 [address], MD Physician               Admission Diagnosis: Depression    * No surgery found *    Results for orders placed or performed during the hospital encounter of 12/18/17   CBC WITH AUTOMATED DIFF   Result Value Ref Range    WBC 7.1 3.6 - 11.0 K/uL    RBC 4.75 3.80 - 5.20 M/uL    HGB 13.4 11.5 - 16.0 g/dL    HCT 41.1 35.0 - 47.0 %    MCV 86.5 80.0 - 99.0 FL    MCH 28.2 26.0 - 34.0 PG    MCHC 32.6 30.0 - 36.5 g/dL    RDW 12.6 11.5 - 14.5 %    PLATELET 415 285 - 469 K/uL    NEUTROPHILS 69 32 - 75 %    LYMPHOCYTES 24 12 - 49 %    MONOCYTES 5 5 - 13 %    EOSINOPHILS 2 0 - 7 %    BASOPHILS 0 0 - 1 %    ABS. NEUTROPHILS 4.9 1.8 - 8.0 K/UL    ABS. LYMPHOCYTES 1.7 0.8 - 3.5 K/UL    ABS. MONOCYTES 0.4 0.0 - 1.0 K/UL    ABS. EOSINOPHILS 0.1 0.0 - 0.4 K/UL    ABS. BASOPHILS 0.0 0.0 - 0.1 K/UL   METABOLIC PANEL, COMPREHENSIVE   Result Value Ref Range    Sodium 138 136 - 145 mmol/L    Potassium 3.9 3.5 - 5.1 mmol/L    Chloride 103 97 - 108 mmol/L    CO2 30 21 - 32 mmol/L    Anion gap 5 5 - 15 mmol/L    Glucose 93 65 - 100 mg/dL    BUN 8 6 - 20 MG/DL    Creatinine 0.67 0.55 - 1.02 MG/DL    BUN/Creatinine ratio 12 12 - 20      GFR est AA >60 >60 ml/min/1.73m2    GFR est non-AA >60 >60 ml/min/1.73m2    Calcium 9.4 8.5 - 10.1 MG/DL    Bilirubin, total 0.3 0.2 - 1.0 MG/DL    ALT (SGPT) 27 12 - 78 U/L    AST (SGOT) 16 15 - 37 U/L    Alk.  phosphatase 99 45 - 117 U/L    Protein, total 7.8 6.4 - 8.2 g/dL    Albumin 3.9 3.5 - 5.0 g/dL    Globulin 3.9 2.0 - 4.0 g/dL    A-G Ratio 1.0 (L) 1.1 - 2.2     URINALYSIS W/ REFLEX CULTURE   Result Value Ref Range    Color YELLOW/STRAW      Appearance CLOUDY (A) CLEAR      Specific gravity 1.015 1.003 - 1.030      pH (UA) 6.0 5.0 - 8.0      Protein NEGATIVE  NEG mg/dL    Glucose NEGATIVE  NEG mg/dL    Ketone NEGATIVE  NEG mg/dL    Bilirubin NEGATIVE  NEG      Blood NEGATIVE NEG      Urobilinogen 0.2 0.2 - 1.0 EU/dL    Nitrites NEGATIVE  NEG      Leukocyte Esterase NEGATIVE  NEG      WBC 0-4 0 - 4 /hpf    RBC 5-10 0 - 5 /hpf    Epithelial cells FEW FEW /lpf    Bacteria NEGATIVE  NEG /hpf    UA:UC IF INDICATED CULTURE NOT INDICATED BY UA RESULT CNI      Hyaline cast 2-5 0 - 5 /lpf   ACETAMINOPHEN   Result Value Ref Range    Acetaminophen level <2 (L) 10 - 30 ug/mL   SALICYLATE   Result Value Ref Range    Salicylate level <4.7 (L) 2.8 - 20.0 MG/DL   ETHYL ALCOHOL   Result Value Ref Range    ALCOHOL(ETHYL),SERUM <10 <10 MG/DL   DRUG SCREEN, URINE   Result Value Ref Range    AMPHETAMINES NEGATIVE  NEG      BARBITURATES NEGATIVE  NEG      BENZODIAZEPINES NEGATIVE  NEG      COCAINE NEGATIVE  NEG      METHADONE NEGATIVE  NEG      OPIATES NEGATIVE  NEG      PCP(PHENCYCLIDINE) NEGATIVE  NEG      THC (TH-CANNABINOL) NEGATIVE  NEG      Drug screen comment (NOTE)    TSH 3RD GENERATION   Result Value Ref Range    TSH 1.02 0.36 - 3.74 uIU/mL   HCG URINE, QL. - POC   Result Value Ref Range    Pregnancy test,urine (POC) NEGATIVE  NEG         Immunizations administered during this encounter: There is no immunization history on file for this patient. Screening for Metabolic Disorders for Patients on Antipsychotic Medications  (Data obtained from the EMR)    Estimated Body Mass Index  Estimated body mass index is 30.23 kg/(m^2) as calculated from the following:    Height as of this encounter: 5' 7\" (1.702 m). Weight as of this encounter: 87.5 kg (193 lb).      Vital Signs/Blood Pressure  Visit Vitals    /88    Pulse 83    Temp 97.5 °F (36.4 °C)    Resp 16    Ht 5' 7\" (1.702 m)    Wt 87.5 kg (193 lb)    LMP 12/03/2017    SpO2 100%    Breastfeeding No    BMI 30.23 kg/m2       Blood Glucose/Hemoglobin A1c  Lab Results   Component Value Date/Time    Glucose 93 12/18/2017 04:54 PM        No results found for: HBA1C, HGBE8, [id number]     Lipid Panel  No results found for: CHOL, CHOLX, CHLST, CHOLV, [id number], HDL, LDL, LDLC, DLDLP, Sancho Ball, HD, [facility]    Discharge Diagnosis: Depression    Discharge Plan:   Aletha Peters  : 1995  MRN: [id number]    The patient Jenelle Miles exhibits the ability to control behavior in a less restrictive environment. Patient's level of functioning is improving. No assaultive/destructive behavior has been observed for the past 24 hours. No suicidal/homicidal threat or behavior has been observed for the past 24 hours. There is no evidence of serious medication side effects. Patient has not been in physical or protective restraints for at least the past 24 hours. If weapons involved, how are they secured? No weapons involved. Is patient aware of and in agreement with discharge plan? Yes    Arrangements for medication:  Prescriptions given to patient. Referral for substance abuse treatment? Not applicable. Referral for smoking cessation needed? Not applicable. Copy of discharge instructions to provider?:  Blake Reynolds @ [phone]    Arrangements for transportation home:  [address] [place] [address] all follow up appointments as scheduled, continue to take prescribed medications per physician instructions. Mental health crisis number:  [phone] or your local mental health crisis line number at [phone].         Discharge Medication List and Instructions:   Current Discharge Medication List          Unresulted Labs     None        To obtain results of studies pending at discharge, please contact [phone]    Follow-up Information     Follow up With Details Comments [address] [facility] Partial Hospitalization Program On 2017 Time of Your Appt: 11:00 am [facility]  [id number] [facility]  MO[address], [address]  (To the left of the Emergency Department)  [place], [place]  [phone]  Fax to Han Justin de Yécora @ [phone]    Bert Reynolds On 2018 Ti[place] of Your Appt: 3:00pm Counseling [facility]  Janelle Crawford [address] [facility], [address] [place] [address]  [phone]    Dr Torri Robert  On 1/16/2018 Time of Your Appt: 12 Noon [employer] [facility]          Advanced Directive:   Does the patient have an appointed surrogate decision maker? No  Does the patient have a Medical Advance Directive? No  Does the patient have a Psychiatric Advance Directive? no  If the patient does not have a surrogate or Medical Advance Directive AND Psychiatric Advance Directive, the patient was offered information on these advance directives Yes  Declined    Patient Instructions: Please continue all medications until otherwise directed by physician. Tobacco Cessation Discharge Plan:   Is the patient a smoker and needs referral for smoking cessation? no  Patient referred to the following for smoking cessation with an appointment? n/a    Patient was offered medication to assist with smoking cessation at discharge? n/a  Was education for smoking cessation added to the discharge instructions? n/a    Alcohol/Substance Abuse Discharge Plan:   Does the patient have a history of substance/alcohol abuse and requires a referral for treatment? No  Patient referred to the following for substance/alcohol abuse treatment with an appointment: n/a  Patient was offered medication to assist with alcohol cessation at discharge? n/a  Was education for substance/alcohol abuse added to discharge instructions? n/a    Patient discharged to Home; provided to the patient/caregiver either in hard copy or electronically.

## 2017-12-22 NOTE — BH NOTES
DISCHARGE SUMMARY    J Carlos Lang  : 1995  MRN: [id number]    The patient Jama Espana exhibits the ability to control behavior in a less restrictive environment. Patient's level of functioning is improving. No assaultive/destructive behavior has been observed for the past 24 hours. No suicidal/homicidal threat or behavior has been observed for the past 24 hours. There is no evidence of serious medication side effects. Patient has not been in physical or protective restraints for at least the past 24 hours. If weapons involved, how are they secured? No weapons involved. Is patient aware of and in agreement with discharge plan? Yes    Arrangements for medication:  Prescriptions given to patient. Referral for substance abuse treatment? Not applicable. Referral for smoking cessation needed? Not applicable. Copy of discharge instructions to provider?:  Moncho Hernadez Dr Day @ [address] [place] [address] for Emotional [facility] @ [phone]    Arrangements for transportation home:  [address] [place] [address] all follow up appointments as scheduled, continue to take prescribed medications per physician instructions. Mental health crisis number:  [phone] or your local mental health crisis line number at [phone].

## 2017-12-22 NOTE — PROGRESS NOTES
NUTRITION brief         Consult received for diet education. Pt with plans to d/c back to her dorm on Monday or Tuesday. She reports limited cooking skills and dining arana will be closed. Pt is vegetarian. Reviewed simple meal ideas and recipes that will be able to be easy to prepare in dorm kitchen. Balance eating also briefly touched on as well. Handouts given and made shopping list tailored to pt foods preferences since pt will be going to the grocery store prior to returning to her dorm.      Tiana Zepeda RD

## 2018-01-01 ENCOUNTER — HOSPITAL ENCOUNTER (EMERGENCY)
Age: 23
Discharge: HOME | End: 2018-01-01
Attending: PEDIATRICS
Payer: COMMERCIAL

## 2018-01-01 VITALS
RESPIRATION RATE: 19 BRPM | WEIGHT: 192.9 LBS | DIASTOLIC BLOOD PRESSURE: 77 MMHG | TEMPERATURE: 98.1 F | OXYGEN SATURATION: 98 % | SYSTOLIC BLOOD PRESSURE: 122 MMHG | HEART RATE: 84 BPM | BODY MASS INDEX: 30.21 KG/M2

## 2018-01-01 DIAGNOSIS — S09.90XA MINOR HEAD INJURY, INITIAL ENCOUNTER: ICD-10-CM
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
## 2018-01-01 LAB
GLUCOSE BLD STRIP.AUTO-MCNC: 92 MG/DL (ref 65–100)
SERVICE CMNT-IMP: NORMAL

## 2018-01-01 PROCEDURE — 93005 ELECTROCARDIOGRAM TRACING: CPT

## 2018-01-01 PROCEDURE — 82962 GLUCOSE BLOOD TEST: CPT

## 2018-01-01 PROCEDURE — 99283 EMERGENCY DEPT VISIT LOW MDM: CPT

## 2018-01-01 RX ORDER — BACITRACIN 500 UNIT/G
1 PACKET (EA) TOPICAL ONCE
Status: DISCONTINUED | OUTPATIENT
Start: 2018-01-01 | End: 2018-01-01 | Stop reason: HOSPADM

## 2018-01-01 NOTE — ED PROVIDER NOTES
HPI Comments: 23 y.o. female with past medical history significant for depression, asthma, and ADHD who presents via EMS from home with chief complaint of fall. She states that she was standing on her bed to stretch and thinks she fell back, hitting her head on a bed post. Does not remember the fall, questionable syncope causing the fall. Afterwards she felt very panicked and became anxious, but has since calmed down. Denies vomiting, dizziness, or visual disturbances. Has a small abrasion over her left eyebrow and one inside her upper lip, bleeding controlled. Complains of a slight headache over the left brow. LMP approximately 12/28/2017. There are no other acute medical concerns at this time. Denies fever, dizziness, vision changes, chest pain, SOB, abdominal pain, vomiting/constipation/diarrhea,  symptoms. Denies chance of pregnancy, not currently on birth control. Social hx: denies smoking, occasional ETOH use, denies illicit drug use  PCP: None        Patient is a 23 y.o. female presenting with head injury. The history is provided by the patient. Head Injury      Associated symptoms include headaches. Pertinent negatives include no chest pain, no visual disturbance, no abdominal pain, no vomiting and no neck pain. Past Medical History:   Diagnosis Date    ADHD     Asthma     Depression     Psychiatric disorder     depression    Sleep disorder     Suicidal thoughts        History reviewed. No pertinent surgical history. History reviewed. No pertinent family history. Social History     Social History    Marital status: SINGLE     Spouse name: N/A    Number of children: N/A    Years of education: N/A     Occupational History    Not on file.      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 Pt is depressed and has SI with no plan. She is from [facility] ,and is homesick. She lives alone. ALLERGIES: Review of patient's allergies indicates no known allergies. Review of Systems   Constitutional: Negative for chills and fever. HENT: Negative for congestion and ear pain. Eyes: Negative for visual disturbance. Respiratory: Negative for shortness of breath. Cardiovascular: Negative for chest pain. Gastrointestinal: Negative for abdominal pain, constipation, diarrhea and vomiting. Genitourinary: Negative for difficulty urinating and dysuria. Musculoskeletal: Negative for back pain, neck pain and neck stiffness. Neurological: Positive for syncope and headaches. Negative for dizziness. Psychiatric/Behavioral: Negative for confusion and decreased concentration. All other systems reviewed and are negative. Vitals:    01/01/18 1437 01/01/18 1441   BP:  122/77   Pulse:  84   Resp:  19   Temp:  98.1 °F (36.7 °C)   SpO2:  98%   Weight: 87.5 kg (192 lb 14.4 oz)             Physical Exam   Constitutional: She is oriented to person, place, and time. She appears well-developed and well-nourished. No distress. HENT:   Head: Normocephalic. Head is with abrasion. Head is without raccoon's eyes and without García's sign. Right Ear: External ear normal.   Left Ear: External ear normal.   Nose: Nose normal.   Mouth/Throat: No oropharyngeal exudate. Eyes: Conjunctivae and EOM are normal. Pupils are equal, round, and reactive to light. Right eye exhibits no discharge. Left eye exhibits no discharge. Neck: Normal range of motion. Neck supple. No rigidity. No tracheal deviation and normal range of motion present. No thyromegaly present. Cardiovascular: Normal rate, regular rhythm, normal heart sounds and intact distal pulses. No murmur heard.   Pulses:       Radial pulses are 2+ on the right side, and 2+ on the left side. Pulmonary/Chest: Effort normal and breath sounds normal. No respiratory distress. She has no decreased breath sounds. She has no wheezes. Abdominal: Soft. Bowel sounds are normal. She exhibits no distension and no mass. There is no tenderness. Musculoskeletal: Normal range of motion. Lymphadenopathy:     She has no cervical adenopathy. Neurological: She is alert and oriented to person, place, and time. No cranial nerve deficit or sensory deficit. She exhibits normal muscle tone. Coordination normal. GCS eye subscore is 4. GCS verbal subscore is 5. GCS motor subscore is 6. Skin: Skin is warm and dry. Abrasion noted. No rash noted. Psychiatric: She has a normal mood and affect. Her behavior is normal. Judgment and thought content normal.   Nursing note and vitals reviewed. MDM  Number of Diagnoses or Management Options  Fall, initial encounter:   Minor head injury, initial encounter:   Diagnosis management comments: 24 yo female with possible syncope preceding a fall. Hit her head on a bed post, small abrasion over the left eyebrow. Neurologically intact, no vomiting dizziness or confusion post-injury. GCS 15, based on exam, no need for imaging     Plan: EKG, POC glucose, observation     ED Course   Attending JEREMIE Quiroz MD in agreement with the plan of care  Venice Vazquez NP    ED EKG interpretation:  Rhythm: normal sinus rhythm; and regular . Rate (approx.): 71; Axis: normal; ST/T wave: normal; No concerns regarding EKG. Evaluated by attending JEREMIE Vazquez NP    4:05 PM  Patient has been reassessed. Feeling better. Reviewed EKG with patient. She drank a protein shake without vomiting and ambulated around the department with a steady gait, no complications. Neurologically intact. Stable for discharge home. Head injury precautions given. Attending MD in agreement with this plan of care.  Patient comfortable with discharge plan.  States the will return to the ED for any concerning symptoms   Tanvi Long NP        Procedures

## 2018-01-01 NOTE — ED NOTES
Awake and alert, answering questions appropriately. Patient has small amount of swelling to LEFT side of head. Small abrasion above left eyebrow and small abrasion under nose/upper lip. No bleeding. Will continue to monitor.

## 2018-01-01 NOTE — ED NOTES
Patient education given on head injury and the patient expresses understanding and acceptance of instructions.  Evette Genao RN 1/1/2018 4:18 PM

## 2018-01-01 NOTE — DISCHARGE INSTRUCTIONS
Head Injury: Care Instructions  Your Care Instructions    Most injuries to the head are minor. Bumps, cuts, and scrapes on the head and face usually heal well and can be treated the same as injuries to other parts of the body. Although it's rare, once in a while a more serious problem shows up after you are home. So it's good to be on the lookout for symptoms for a day or two. Follow-up care is a key part of your treatment and safety. Be sure to make and go to all appointments, and call your doctor if you are having problems. It's also a good idea to know your test results and keep a list of the medicines you take. How can you care for yourself at home? · Follow your doctor's instructions. He or she will tell you if you need someone to watch you closely for the next 24 hours or longer. · Take it easy for the next few days or more if you are not feeling well. · Ask your doctor when it's okay for you to go back to activities like driving a car, riding a bike, or operating machinery. When should you call for help? Call 911 anytime you think you may need emergency care. For example, call if:  ? · You have a seizure. ? · You passed out (lost consciousness). ? · You are confused or can't stay awake. ?Call your doctor now or seek immediate medical care if:  ? · You have new or worse vomiting. ? · You feel less alert. ? · You have new weakness or numbness in any part of your body. ? Watch closely for changes in your health, and be sure to contact your doctor if:  ? · You do not get better as expected. ? · You have new symptoms, such as headaches, trouble concentrating, or changes in mood. Where can you learn more? Go to http://ashia-luci.info/. Enter H898 in the search box to learn more about \"Head Injury: Care Instructions. \"  Current as of: October 14, 2016  Content Version: 11.4  © 4208-8099 Healthwise, Incorporated.  Care instructions adapted under license by [employer] (which disclaims liability or warranty for this information). If you have questions about a medical condition or this instruction, always ask your healthcare professional. [address]  any warranty or liability for your use of this information. [facility] Departments     For adult and child immunizations, family planning, TB screening, STD testing and women's health services. [facility]: [place] [phone]      [facility] D 25   [address]   [address]   170 [facility]: [facility] 200 [facility] [phone]      [address] [facility]          [address] [address]     For primary care services, woman and child wellness, and some clinics providing specialty care. [facility] -- [facility]. [facility]5 [facility] [phone]   [address] [place] [address] 200 [facility] [address] [place] [address] [phone]   339 [facility] [address] [place] [address] [phone]   [address]  [employer] [address] [place] [address] 5850  Community  [phone]   [address] [place] [address] [phone]   [address] [phone]   Jazmyne Schreibero [facility] 1051 [facility] [phone]   [facility]: [facility] 700 taz Walker [address] [place] [address], [address] [phone]     [address] [phone]   [facility] 5850  Community  [phone]   [employer]  [address], [address] ([email or web address]/about/mission. asp) [phone]         Sexual Health/Woman Wellness Clinics    For STD/HIV testing and treatment, pregnancy testing and services, men's health, birth control services, LGBT services, and hepatitis/HPV vaccine services. [facility] for [place] [employer] 201 N. [facility] [address] [place] [address] [address] Maynor Ray [phone] [address] Israel Delcid [phone]   [facility] [address], 5th floor [phone]   Pregnancy [address] for Women [address]  Jennifer [phone]         [place] [address] [facility] [address] [place] [address]   [phone]   [place]   [phone]   Women, Infant and Children's Services: [facility] 24 [phone]       600 [facility]   [phone]   [facility] [address]   [phone]   [facility]     [phone]   [address] Crisis   [address] [facility] [address] [phone]     Local Primary Care Physicians  [facility] [phone]  MD Ubaldo Mendoza Mc, MD Bartolo Ferry, MD [facility] Community Doctors [phone]  Genna Cleaning, [facility]  MD Ronald Hayden MD Suzen Peed, MD [address] [phone]  MD Ailyn Escobar MD [address] [facility] [phone]  MD Tessy Joshi MD Rondal Grave, MD Madeline Helper, MD   [facility] [phone]  [facility] TQL[facility]W TU, MD Kemi Barragan, NP [address] [facility] [address] [phone]  MD Asia Humphries MD Scarlet Murdoch, MD Coleridge Locker, MD Rolene Cohen, MD Dulce Smack, MD Juliann Leghorn, MD   6318 [facility] [phone]  Vinod Jarvis MD [address] [facility] [address] [phone]  MD Jesus Chapa, NP  MD Kareem Block MD Tenny Gaskin, MD Chantel Grant, MD Axel Hernandez MD   5393 [facility] [phone]  MD Lily Griggs, FNP  Kiana Marino, NP  Janiya Walter, MD Butch Fleming, MD Surinder Li, MD Jaci Coronel, MD [place] [facility] [phone]  Darius Males, MD Lew Lopez, MD Stephan Jain, MD Candie Rodriguez, MD Сергей Funez MD   [address] [phone]  Sin Mclain, MD Neema Franz MD [facility] [phone]  Samy Vance, MD Evette Mcclure, MD Luis Alberto Hunter, MD   [facility] [phone]  Claudio Amin, MD Ruel Borrero, MD Javier Pizarro, MD Uriel Ponce, MD Raji Rain, MD Anny Blanc, NP  Chapincito Garcia MD [address]   [phone]  Aidan Rendon, MD Israel Degroot, MD KM Baldwin [facility] [phone]  MD Carlos Alberto Rousseau, JIMMIE Ordonez, FNP Velda Moons, PA-C Irven Saver, MD Kristene Gottron, NP   Yovana Carrillo, DO Miscellaneous:  Annie Sanon [phone]

## 2018-01-01 NOTE — ED TRIAGE NOTES
TRIAGE: Patient was standing on bed and fell off, hitting the bed post. Patient c/o mouth pain and head pain. Abrasion to L upper eyebrow. No LOC.

## 2018-01-02 LAB
ATRIAL RATE: 71 BPM
CALCULATED P AXIS, ECG09: -2 DEGREES
CALCULATED R AXIS, ECG10: 17 DEGREES
CALCULATED T AXIS, ECG11: 18 DEGREES
DIAGNOSIS, 93000: NORMAL
P-R INTERVAL, ECG05: 138 MS
Q-T INTERVAL, ECG07: 394 MS
QRS DURATION, ECG06: 74 MS
QTC CALCULATION (BEZET), ECG08: 428 MS
VENTRICULAR RATE, ECG03: 71 BPM

## 2018-01-07 NOTE — DISCHARGE SUMMARY
PSYCHIATRIC DISCHARGE SUMMARY         IDENTIFICATION:    Patient Name  Emerson Good   Date of Birth 1995   [facility] [id number]   Medical Record Number  [id number]      Age  23 y.o. PCP None   Admit date:  12/18/2017    Discharge date: 12/22/17   Room Number  733/01  @ [facility]   Date of Service  12/22/17            TYPE OF DISCHARGE: REGULAR               CONDITION AT DISCHARGE: improved       PROVISIONAL & DISCHARGE DIAGNOSES:    Problem List  Never Reviewed          Codes Class    * (Principal)Depression ICD-10-CM: F32.9  ICD-9-CM: [address] Problems    *Depression        DISCHARGE DIAGNOSIS:   Axis I:  SEE ABOVE  Axis II: SEE ABOVE  Axis III: SEE ABOVE  Axis IV:  lack of structure  Axis V:  35 on admission, 65 on discharge 65(baseline)       CC & HISTORY OF PRESENT ILLNESS:  The patient, Emerson Good, is a 23 y.o. WHITE OR  female with a past psychiatric history significant for depression , who presents at this time with complaints of (and/or evidence of) the following emotional symptoms: suicidal thoughts/threats. Additional symptomatology include anxiety. The above symptoms have been present for 7 months . These symptoms are of severe severity. These symptoms are constant  in nature. The patient's condition has been precipitated by homesickness and psychosocial stressors (benzodiazepine overuse ). Patient's condition made worse by treatment noncompliance. BAL=0.      SOCIAL HISTORY:    Social History     Social History    Marital status: SINGLE     Spouse name: N/A    Number of children: N/A    Years of education: N/A     Occupational History    Not on file.      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 Pt is depressed and has SI with no plan. She is from [facility] ,and is homesick. She lives alone. FAMILY HISTORY:   History reviewed. No pertinent family history. HOSPITALIZATION COURSE:    Suyapa Domingo was admitted to the inpatient psychiatric unit [facility] for acute psychiatric stabilization in regards to symptomatology as described in the HPI above. The differential diagnosis at time of admission included: MDD vs. Borderline personality disorder. While on the unit Suyapa Domingo was involved in individual, group, occupational and milieu therapy. Psychiatric medications were adjusted during this hospitalization including (see below). Suyapa Domingo demonstrated a slow, but progressive improvement in overall condition. Much of patient's depression appeared to be related to situational stressors,  and psychological factors. Please see individual progress notes for more specific details regarding patient's hospitalization course. At time of discharge, Suyapa Domingo is without significant problems of depression or SI. Patient free of suicidal and homicidal ideations (appears to be at very low risk of suicide or homicide) and reports many positive predictive factors in terms of not attempting suicide or homicide. Overall presentation at time of discharge is most consistent with the diagnosis of MDD. Patient with request for discharge today. There are no grounds to seek a TDO. Patient has maximized benefit to be derived from acute inpatient psychiatric treatment. All members of the treatment team concur with each other in regards to plans for discharge today as per patient's request.  Patient and family are aware and in agreement with discharge and discharge plan.            LABS AND IMAGING:    Labs Reviewed   METABOLIC PANEL, COMPREHENSIVE - Abnormal; Notable for the following:        Result Value    A-G Ratio 1.0 (*)     All other components within normal limits   URINALYSIS W/ REFLEX CULTURE - Abnormal; Notable for the following:     Appearance CLOUDY (*)     All other components within normal limits   ACETAMINOPHEN - Abnormal; Notable for the following:     Acetaminophen level <2 (*)     All other components within normal limits   SALICYLATE - Abnormal; Notable for the following:     Salicylate level <4.9 (*)     All other components within normal limits   CBC WITH AUTOMATED DIFF   ETHYL ALCOHOL   DRUG SCREEN, URINE   SAMPLES BEING HELD   TSH 3RD GENERATION   HCG URINE, QL. - POC     No results found for: DS35, PHEN, PHENO, PHENT, DILF, DS39, PHENY, PTN, VALF2, VALAC, VALP, VALPR, DS6, CRBAM, CRBAMP, CARB2, XCRBAM  Admission on 12/18/2017, Discharged on 12/22/2017   Component Date Value Ref Range Status    WBC 12/18/2017 7.1  3.6 - 11.0 K/uL Final    RBC 12/18/2017 4.75  3.80 - 5.20 M/uL Final    HGB 12/18/2017 13.4  11.5 - 16.0 g/dL Final    HCT 12/18/2017 41.1  35.0 - 47.0 % Final    MCV 12/18/2017 86.5  80.0 - 99.0 FL Final    MCH 12/18/2017 28.2  26.0 - 34.0 PG Final    MCHC 12/18/2017 32.6  30.0 - 36.5 g/dL Final    RDW 12/18/2017 12.6  11.5 - 14.5 % Final    PLATELET 59/87/2277 529  150 - 400 K/uL Final    NEUTROPHILS 12/18/2017 69  32 - 75 % Final    LYMPHOCYTES 12/18/2017 24  12 - 49 % Final    MONOCYTES 12/18/2017 5  5 - 13 % Final    EOSINOPHILS 12/18/2017 2  0 - 7 % Final    BASOPHILS 12/18/2017 0  0 - 1 % Final    ABS. NEUTROPHILS 12/18/2017 4.9  1.8 - 8.0 K/UL Final    ABS. LYMPHOCYTES 12/18/2017 1.7  0.8 - 3.5 K/UL Final    ABS. MONOCYTES 12/18/2017 0.4  0.0 - 1.0 K/UL Final    ABS. EOSINOPHILS 12/18/2017 0.1  0.0 - 0.4 K/UL Final    ABS.  BASOPHILS 12/18/2017 0.0  0.0 - 0.1 K/UL Final    Sodium 12/18/2017 138  136 - 145 mmol/L Final    Potassium 12/18/2017 3.9  3.5 - 5.1 mmol/L Final    Chloride 12/18/2017 103  97 - 108 mmol/L Final    CO2 12/18/2017 30  21 - 32 mmol/L Final    Anion gap 12/18/2017 5  5 - 15 mmol/L Final    Glucose 12/18/2017 93  65 - 100 mg/dL Final    BUN 12/18/2017 8  6 - 20 MG/DL Final    Creatinine 12/18/2017 0.67  0.55 - 1.02 MG/DL Final    BUN/Creatinine ratio 12/18/2017 12  12 - 20   Final    GFR est AA 12/18/2017 >60  >60 ml/min/1.73m2 Final    GFR est non-AA 12/18/2017 >60  >60 ml/min/1.73m2 Final    Calcium 12/18/2017 9.4  8.5 - 10.1 MG/DL Final    Bilirubin, total 12/18/2017 0.3  0.2 - 1.0 MG/DL Final    ALT (SGPT) 12/18/2017 27  12 - 78 U/L Final    AST (SGOT) 12/18/2017 16  15 - 37 U/L Final    Alk.  phosphatase 12/18/2017 99  45 - 117 U/L Final    Protein, total 12/18/2017 7.8  6.4 - 8.2 g/dL Final    Albumin 12/18/2017 3.9  3.5 - 5.0 g/dL Final    Globulin 12/18/2017 3.9  2.0 - 4.0 g/dL Final    A-G Ratio 12/18/2017 1.0* 1.1 - 2.2   Final    Color 12/18/2017 YELLOW/STRAW    Final    Appearance 12/18/2017 CLOUDY* CLEAR   Final    Specific gravity 12/18/2017 1.015  1.003 - 1.030   Final    pH (UA) 12/18/2017 6.0  5.0 - 8.0   Final    Protein 12/18/2017 NEGATIVE   NEG mg/dL Final    Glucose 12/18/2017 NEGATIVE   NEG mg/dL Final    Ketone 12/18/2017 NEGATIVE   NEG mg/dL Final    Bilirubin 12/18/2017 NEGATIVE   NEG   Final    Blood 12/18/2017 NEGATIVE   NEG   Final    Urobilinogen 12/18/2017 0.2  0.2 - 1.0 EU/dL Final    Nitrites 12/18/2017 NEGATIVE   NEG   Final    Leukocyte Esterase 12/18/2017 NEGATIVE   NEG   Final    WBC 12/18/2017 0-4  0 - 4 /hpf Final    RBC 12/18/2017 5-10  0 - 5 /hpf Final    Epithelial cells 12/18/2017 FEW  FEW /lpf Final    Bacteria 12/18/2017 NEGATIVE   NEG /hpf Final    UA:UC IF INDICATED 12/18/2017 CULTURE NOT INDICATED BY UA RESULT  CNI   Final    Hyaline cast 12/18/2017 2-5  0 - 5 /lpf Final    Acetaminophen level 12/18/2017 <2* 10 - 30 ug/mL Final    Salicylate level 91/32/5447 <1.7* 2.8 - 20.0 MG/DL Final    ALCOHOL(ETHYL),SERUM 12/18/2017 <10  <10 MG/DL Final    AMPHETAMINES 12/18/2017 NEGATIVE   NEG   Final    BARBITURATES 12/18/2017 NEGATIVE   NEG   Final    BENZODIAZEPINES 12/18/2017 NEGATIVE   NEG   Final    COCAINE 12/18/2017 NEGATIVE   NEG   Final    METHADONE 12/18/2017 NEGATIVE   NEG   Final    OPIATES 12/18/2017 NEGATIVE   NEG   Final    PCP(PHENCYCLIDINE) 12/18/2017 NEGATIVE   NEG   Final    THC (TH-CANNABINOL) 12/18/2017 NEGATIVE   NEG   Final    Drug screen comment 12/18/2017 (NOTE)   Final    Pregnancy test,urine (POC) 12/18/2017 NEGATIVE   NEG   Final    TSH 12/18/2017 1.02  0.36 - 3.74 uIU/mL Final     No results found. DISPOSITION:    Home. Patient to f/u with psychiatric and psychotherapy appointments. Patient is to f/u with internist as directed. FOLLOW-UP CARE:    Activity as tolerated  Regular Diet  Wound Care: none needed. Follow-up Information     Follow up With Details Comments [facility] Partial Hospitalization Program On 12/26/2017 Time of Your Appt: 11:00 am [place] [facility]  [id number] [facility]  MOB [address], [address]  (To the left of the Emergency Department)  [facility]  [phone]  Fax to [facility] @ [phone]    Mary Reynolds Brettky On 1/2/2018 Tiome of Your Appt: 3:00pm Counseling and Psychological Services  Clarke Cisneros [address] [place] [address]  [phone]    Dr Candace Juarez  On 1/16/2018 Time of Your Appt: 12 Noon [employer] [facility]    None   None ([phone]) Patient stated that they have no PCP                   PROGNOSIS:   Good -- based on nature of patient's pathology/ies and treatment compliance issues. Prognosis is greatly dependent upon patient's ability to  F/u psychiatric/psychotherapy appointments as well as to comply with psychiatric medications as prescribed.             DISCHARGE MEDICATIONS:     Informed consent given for the use of following psychotropic medications:  Discharge Medication List as of 12/22/2017  3:56 PM      CONTINUE these medications which have CHANGED Details   sertraline (ZOLOFT) 100 mg tablet Take 1.5 Tabs by mouth daily. Indications: major depressive disorder, Normal, Disp-45 Tab, R-0         CONTINUE these medications which have NOT CHANGED    Details   methylphenidate HCl (RITALIN) 5 mg tablet Take 15 mg by mouth two (2) times a day., Historical Med         STOP taking these medications       diazePAM (VALIUM) 5 mg tablet Comments:   Reason for Stopping:                      A coordinated, multidisplinary treatment team round was conducted with Tray Brooks is done daily here at [facility]. This team consists of the nurse, psychiatric unit pharmacist,  and writer. I have spent greater than 35 minutes on discharge work.     Signed:  Madhuri Dumont MD  12/22/17
